# Patient Record
Sex: FEMALE | Race: BLACK OR AFRICAN AMERICAN | Employment: FULL TIME | URBAN - METROPOLITAN AREA
[De-identification: names, ages, dates, MRNs, and addresses within clinical notes are randomized per-mention and may not be internally consistent; named-entity substitution may affect disease eponyms.]

---

## 2020-08-30 ENCOUNTER — HOSPITAL ENCOUNTER (EMERGENCY)
Age: 20
Discharge: HOME | End: 2020-08-30
Attending: EMERGENCY MEDICINE
Payer: MEDICAID

## 2020-08-30 ENCOUNTER — APPOINTMENT (OUTPATIENT)
Dept: ULTRASOUND IMAGING | Age: 20
End: 2020-08-30
Payer: MEDICAID

## 2020-08-30 VITALS
RESPIRATION RATE: 18 BRPM | BODY MASS INDEX: 17.08 KG/M2 | DIASTOLIC BLOOD PRESSURE: 70 MMHG | OXYGEN SATURATION: 100 % | SYSTOLIC BLOOD PRESSURE: 114 MMHG | HEART RATE: 79 BPM | HEIGHT: 69 IN | WEIGHT: 115.3 LBS | TEMPERATURE: 98.4 F

## 2020-08-30 LAB
A/G RATIO: 1.2 (ref 1.1–2.2)
ALBUMIN SERPL-MCNC: 4 G/DL (ref 3.4–5)
ALP BLD-CCNC: 47 U/L (ref 40–129)
ALT SERPL-CCNC: 9 U/L (ref 10–40)
ANION GAP SERPL CALCULATED.3IONS-SCNC: 14 MMOL/L (ref 3–16)
AST SERPL-CCNC: 13 U/L (ref 15–37)
BACTERIA WET PREP: NORMAL
BACTERIA: ABNORMAL /HPF
BASOPHILS ABSOLUTE: 0 K/UL (ref 0–0.2)
BASOPHILS RELATIVE PERCENT: 0.6 %
BILIRUB SERPL-MCNC: 0.5 MG/DL (ref 0–1)
BILIRUBIN URINE: ABNORMAL
BLOOD, URINE: ABNORMAL
BUN BLDV-MCNC: 7 MG/DL (ref 7–20)
CALCIUM SERPL-MCNC: 9.4 MG/DL (ref 8.3–10.6)
CHLORIDE BLD-SCNC: 100 MMOL/L (ref 99–110)
CLARITY: ABNORMAL
CLUE CELLS: NORMAL
CO2: 23 MMOL/L (ref 21–32)
COLOR: YELLOW
CREAT SERPL-MCNC: 0.6 MG/DL (ref 0.6–1.1)
EOSINOPHILS ABSOLUTE: 0 K/UL (ref 0–0.6)
EOSINOPHILS RELATIVE PERCENT: 0.6 %
EPITHELIAL CELLS WET PREP: NORMAL
EPITHELIAL CELLS, UA: ABNORMAL /HPF (ref 0–5)
GFR AFRICAN AMERICAN: >60
GFR NON-AFRICAN AMERICAN: >60
GLOBULIN: 3.4 G/DL
GLUCOSE BLD-MCNC: 81 MG/DL (ref 70–99)
GLUCOSE URINE: NEGATIVE MG/DL
GONADOTROPIN, CHORIONIC (HCG) QUANT: NORMAL MIU/ML
HCT VFR BLD CALC: 38.9 % (ref 36–48)
HEMOGLOBIN: 13.1 G/DL (ref 12–16)
KETONES, URINE: ABNORMAL MG/DL
LEUKOCYTE ESTERASE, URINE: NEGATIVE
LYMPHOCYTES ABSOLUTE: 1.2 K/UL (ref 1–5.1)
LYMPHOCYTES RELATIVE PERCENT: 19.5 %
MCH RBC QN AUTO: 30.3 PG (ref 26–34)
MCHC RBC AUTO-ENTMCNC: 33.8 G/DL (ref 31–36)
MCV RBC AUTO: 89.7 FL (ref 80–100)
MICROSCOPIC EXAMINATION: YES
MONOCYTES ABSOLUTE: 0.5 K/UL (ref 0–1.3)
MONOCYTES RELATIVE PERCENT: 7.6 %
MUCUS: ABNORMAL /LPF
NEUTROPHILS ABSOLUTE: 4.6 K/UL (ref 1.7–7.7)
NEUTROPHILS RELATIVE PERCENT: 71.7 %
NITRITE, URINE: NEGATIVE
PDW BLD-RTO: 13.3 % (ref 12.4–15.4)
PH UA: 6.5 (ref 5–8)
PLATELET # BLD: 262 K/UL (ref 135–450)
PMV BLD AUTO: 7.5 FL (ref 5–10.5)
POTASSIUM REFLEX MAGNESIUM: 3.6 MMOL/L (ref 3.5–5.1)
PROTEIN UA: NEGATIVE MG/DL
RBC # BLD: 4.34 M/UL (ref 4–5.2)
RBC UA: ABNORMAL /HPF (ref 0–4)
RBC WET PREP: NORMAL
SODIUM BLD-SCNC: 137 MMOL/L (ref 136–145)
SOURCE WET PREP: NORMAL
SPECIFIC GRAVITY UA: 1.02 (ref 1–1.03)
TOTAL PROTEIN: 7.4 G/DL (ref 6.4–8.2)
TRICHOMONAS PREP: NORMAL
URINE REFLEX TO CULTURE: ABNORMAL
URINE TYPE: ABNORMAL
UROBILINOGEN, URINE: 0.2 E.U./DL
WBC # BLD: 6.4 K/UL (ref 4–11)
WBC UA: ABNORMAL /HPF (ref 0–5)
WBC WET PREP: NORMAL
YEAST WET PREP: NORMAL

## 2020-08-30 PROCEDURE — 87591 N.GONORRHOEAE DNA AMP PROB: CPT

## 2020-08-30 PROCEDURE — 81001 URINALYSIS AUTO W/SCOPE: CPT

## 2020-08-30 PROCEDURE — 99284 EMERGENCY DEPT VISIT MOD MDM: CPT

## 2020-08-30 PROCEDURE — 84702 CHORIONIC GONADOTROPIN TEST: CPT

## 2020-08-30 PROCEDURE — 36415 COLL VENOUS BLD VENIPUNCTURE: CPT

## 2020-08-30 PROCEDURE — 87210 SMEAR WET MOUNT SALINE/INK: CPT

## 2020-08-30 PROCEDURE — 80053 COMPREHEN METABOLIC PANEL: CPT

## 2020-08-30 PROCEDURE — 85025 COMPLETE CBC W/AUTO DIFF WBC: CPT

## 2020-08-30 PROCEDURE — 76801 OB US < 14 WKS SINGLE FETUS: CPT

## 2020-08-30 PROCEDURE — 87491 CHLMYD TRACH DNA AMP PROBE: CPT

## 2020-08-30 RX ORDER — PRENATAL VIT/IRON FUM/FOLIC AC 27MG-0.8MG
1 TABLET ORAL DAILY
Qty: 30 TABLET | Refills: 0 | Status: SHIPPED | OUTPATIENT
Start: 2020-08-30 | End: 2020-09-29

## 2020-08-30 SDOH — HEALTH STABILITY: MENTAL HEALTH: HOW OFTEN DO YOU HAVE A DRINK CONTAINING ALCOHOL?: NEVER

## 2020-08-30 ASSESSMENT — PAIN DESCRIPTION - DESCRIPTORS: DESCRIPTORS: CRAMPING

## 2020-08-30 ASSESSMENT — PAIN SCALES - GENERAL
PAINLEVEL_OUTOF10: 0
PAINLEVEL_OUTOF10: 0
PAINLEVEL_OUTOF10: 4

## 2020-08-30 ASSESSMENT — PAIN DESCRIPTION - PAIN TYPE: TYPE: ACUTE PAIN

## 2020-08-30 ASSESSMENT — PAIN DESCRIPTION - LOCATION: LOCATION: ABDOMEN

## 2020-08-30 ASSESSMENT — PAIN DESCRIPTION - ORIENTATION: ORIENTATION: LOWER;MID

## 2020-08-30 ASSESSMENT — ENCOUNTER SYMPTOMS: ABDOMINAL PAIN: 1

## 2020-08-30 NOTE — ED NOTES
Explained new orders. Already up to bathroom. abd pain at 4      Explained self swab procedure to pt. Detailed handout given to explain procedure as well. Pt performed self swab procedure for specimens with supervision of RN. Specimens to lab. Tolerated well.          Ember Vitale RN  08/30/20 7834

## 2020-08-30 NOTE — ED PROVIDER NOTES
CHIEF COMPLAINT  Abdominal Pain (to lower abd 5/10 x 2 days, pain worse at night, with nausea. denies discharge. pt states positive pregnancy tests in early August, LMP [facility] July. \" per pt)      HISTORY OF PRESENT ILLNESS  Hanna Grey is a 23 y.o.  female who presents to the ED complaining of lower abdominal pain over the past 2 to 3 days. Patient states that she for started noticing some lower abdominal cramping or 1 week ago and states that over the past 2 to 3 days the pain is gotten worse. States she did have a positive home pregnancy test at the beginning of the month. Denies any associated vaginal bleeding. No vaginal discharge. No nausea or vomiting. No fevers or chills. Patient states he has been sexually active but is not concerned for any STDs. Does not use protection. Denies take any medication for pain prior to coming to the emergency room. States he is been having normal bowel movements. No other complaints, modifying factors or associated symptoms. Nursing notes reviewed. History reviewed. No pertinent past medical history. History reviewed. No pertinent surgical history. History reviewed. No pertinent family history.   Social History     Socioeconomic History    Marital status: Single     Spouse name: Not on file    Number of children: Not on file    Years of education: Not on file    Highest education level: Not on file   Occupational History    Not on file   Social Needs    Financial resource strain: Not on file    Food insecurity     Worry: Not on file     Inability: Not on file    Transportation needs     Medical: Not on file     Non-medical: Not on file   Tobacco Use    Smoking status: Never Smoker    Smokeless tobacco: Never Used   Substance and Sexual Activity    Alcohol use: Never     Frequency: Never    Drug use: Never    Sexual activity: Not on file   Lifestyle    Physical activity     Days per week: Not on file     Minutes per session: Not on file    Stress: Not on file   Relationships    Social connections     Talks on phone: Not on file     Gets together: Not on file     Attends Latter-day service: Not on file     Active member of club or organization: Not on file     Attends meetings of clubs or organizations: Not on file     Relationship status: Not on file    Intimate partner violence     Fear of current or ex partner: Not on file     Emotionally abused: Not on file     Physically abused: Not on file     Forced sexual activity: Not on file   Other Topics Concern    Not on file   Social History Narrative    Not on file     No current facility-administered medications for this encounter. No current outpatient medications on file. No Known Allergies      REVIEW OF SYSTEMS  10 systems reviewed, pertinent positives per HPI otherwise noted to be negative    PHYSICAL EXAM  /72   Pulse 88   Temp 97.8 °F (36.6 °C) (Infrared)   Resp 14   Ht 5' 9\" (1.753 m)   Wt 115 lb 4.8 oz (52.3 kg)   LMP 07/01/2020   SpO2 99%   Breastfeeding No   BMI 17.03 kg/m²      CONSTITUTIONAL: AOx4, cooperative with exam, afebrile   HEAD: normocephalic, atraumatic   EYES: PERRL, EOMI, anicteric sclera   ENT: Moist mucous membranes, uvula midline   LUNGS: Bilateral breath sounds, CTAB, no rales/ronchi/wheezes   CARDIOVASCULAR: RRR, normal S1/S2, no m/r/g, 2+ pulses throughout   ABDOMEN: Soft, suprapubic tenderness, no rebound tenderness or guarding, no upper abdominal tenderness, negative McBurney's point, negative Dent sign, no rigidity, non-distended, +BS   NEUROLOGIC:  MAEx4, GCS 15   MUSCULOSKELETAL: No clubbing, cyanosis or edema   SKIN: No rash, pallor or wounds on exposed surfaces     Patient offered pelvic exam but refused. RADIOLOGY  X-RAYS:  I have reviewed radiologic plain film image(s). ALL OTHER NON-PLAIN FILM IMAGES SUCH AS CT, ULTRASOUND AND MRI HAVE BEEN READ BY THE RADIOLOGIST.   No orders to display          EKG INTERPRETATION  None    PROCEDURES    ED COURSE/MDM  UTI, STD, pyelonephritis, abdominal pain in pregnancy, round ligament pain, ectopic pregnancy    Patient seen and evaluated. History and physical as above. Nontoxic, afebrile. Patient with lower abdominal pain and a home pregnancy test that was positive at the beginning of the month. No complaints of bleeding. Discussed that we do recommend a pelvic exam but patient refused. Patient did agree to self swab. No peritoneal signs on abdominal exam.  Patient offered Tylenol but refused as well. Discussed obtaining quantitative hCG, routine labs, urinalysis, pelvic swabs and patient was agreeable to this work-up. ED Course as of Aug 30 1235   Sun Aug 30, 2020   1234 Patient's quantitative hCG [id number]. Patient updated on results. Recommended that patient go to [facility] for transvaginal ultrasound to rule out ectopic pregnancy. Patient agreeable. Patient would like to go by private vehicle and I feel this is appropriate as awaiting transport would take several hours. Remainder patient's lab work unremarkable. Spoke with Dr. Isaiah Tavares, ED attending at [facility], who did accept transfer. [DS]      ED Course User Index  [DS] Hector Richard MD       Patient was given scripts for the following medications. I counseled patient how to take these medications. New Prescriptions    No medications on file     CRITICAL CARE TIME   Total Critical Care time was 22 minutes, excluding separately reportable procedures. There was a high probability of clinically significant/life threatening deterioration in the patient's condition which required my urgent intervention. CLINICAL IMPRESSION  1. Abdominal pain affecting pregnancy        Blood pressure 110/72, pulse 88, temperature 97.8 °F (36.6 °C), temperature source Infrared, resp.  rate 14, height 5' 9\" (1.753 m), weight 115 lb 4.8 oz (52.3 kg), last menstrual period 07/01/2020, SpO2 99 %, not currently breastfeeding. DISPOSITION  Transfer to [facility] for [address]: All medical record entries made by [address] [place] [address] [address]  dictation.       (Please note that this note was completed with a voice recognition program. Every attempt was made to edit the dictations, but inevitably there remain words that are mis-transcribed.)           Stefani Calhoun MD  08/30/20 1235       Stefani Calhoun MD  08/30/20 [phone]

## 2020-08-30 NOTE — ED NOTES
abd pain at 4. Resting on stretcher. Blankets given. Awaiting test results.      Urban Cantor RN  08/30/20 0797

## 2020-08-30 NOTE — ED NOTES
Bed: B-07  Expected date:   Expected time:   Means of arrival:   Comments:  521 Lenny Szymanski RN  08/30/20 0688

## 2020-08-30 NOTE — ED NOTES
Walked pt from [facility] to ED bed. Obtained VS. Pt wearing mask, medic wearing mask, gloves, safety glasses.      Kierra Stewart, EMT-P  08/30/20 9740

## 2020-08-30 NOTE — ED PROVIDER NOTES
5.20 M/uL    Hemoglobin 13.1 12.0 - 16.0 g/dL    Hematocrit 38.9 36.0 - 48.0 %    MCV 89.7 80.0 - 100.0 fL    MCH 30.3 26.0 - 34.0 pg    MCHC 33.8 31.0 - 36.0 g/dL    RDW 13.3 12.4 - 15.4 %    Platelets 298 508 - 113 K/uL    MPV 7.5 5.0 - 10.5 fL    Neutrophils % 71.7 %    Lymphocytes % 19.5 %    Monocytes % 7.6 %    Eosinophils % 0.6 %    Basophils % 0.6 %    Neutrophils Absolute 4.6 1.7 - 7.7 K/uL    Lymphocytes Absolute 1.2 1.0 - 5.1 K/uL    Monocytes Absolute 0.5 0.0 - 1.3 K/uL    Eosinophils Absolute 0.0 0.0 - 0.6 K/uL    Basophils Absolute 0.0 0.0 - 0.2 K/uL   Comprehensive Metabolic Panel w/ Reflex to MG   Result Value Ref Range    Sodium 137 136 - 145 mmol/L    Potassium reflex Magnesium 3.6 3.5 - 5.1 mmol/L    Chloride 100 99 - 110 mmol/L    CO2 23 21 - 32 mmol/L    Anion Gap 14 3 - 16    Glucose 81 70 - 99 mg/dL    BUN 7 7 - 20 mg/dL    CREATININE 0.6 0.6 - 1.1 mg/dL    GFR Non-African American >60 >60    GFR African American >60 >60    Calcium 9.4 8.3 - 10.6 mg/dL    Total Protein 7.4 6.4 - 8.2 g/dL    Alb 4.0 3.4 - 5.0 g/dL    Albumin/Globulin Ratio 1.2 1.1 - 2.2    Total Bilirubin 0.5 0.0 - 1.0 mg/dL    Alkaline Phosphatase 47 40 - 129 U/L    ALT 9 (L) 10 - 40 U/L    AST 13 (L) 15 - 37 U/L    Globulin 3.4 g/dL   Microscopic Urinalysis   Result Value Ref Range    Mucus, UA 2+ (A) None Seen /LPF    WBC, UA 3-5 0 - 5 /HPF    RBC, UA 3-4 0 - 4 /HPF    Epithelial Cells, UA 11-20 (A) 0 - 5 /HPF    Bacteria, UA 2+ (A) None Seen /HPF     Us Ob Less Than 14 Weeks Single Or First Gestation    Result Date: 8/30/2020  EXAMINATION: FIRST TRIMESTER OBSTETRIC ULTRASOUND 8/30/2020 TECHNIQUE: Transabdominal and transvaginal first trimester obstetric pelvic ultrasound was performed with color Doppler flow evaluation.  COMPARISON: None HISTORY: ORDERING SYSTEM PROVIDED HISTORY: Concern for ectopic TECHNOLOGIST PROVIDED HISTORY: Reason for exam:-> Concern for ectopic Reason for EXAM: Pelvic pain x 2 days with early preg 2. Abdominal pain affecting pregnancy        DISPOSITION Decision To Discharge 08/30/2020 03:39:46 PM     (Please note that portions of this note may have been completed with a voice recognition program. Efforts were made to edit the dictations but occasionally words are mis-transcribed.)    Debby Bay MD  [address] [place] [address] MD [place]  08/30/20 1529

## 2020-08-30 NOTE — ED PROVIDER NOTES
[address] [facility]      Pt Name: Delmy Escalante  MRN: [id number]  [place] 2000  Date of evaluation: 2020  Provider: MD Jo Ann Perkins       Chief Complaint   Patient presents with    Abdominal Pain     to lower abd 5/10 x 2 days, pain worse at night, with nausea. denies discharge. pt states positive pregnancy tests in early August, LMP [facility] July. \" per pt         HISTORY OF PRESENT ILLNESS   (Location/Symptom, Timing/Onset, Context/Setting, Quality, Duration, Modifying Factors, Severity)  Note limiting factors. Delmy Escalante is a 23 y.o. female who presents to the emergency department for ultrasound. HPI     This is a 58-year-old -American female who is a G1,  who presents with abdominal pain for several days. She was worked up at an outlying facility and was transferred here for ultrasound for concern for rule out ectopic. Nursing Notes were reviewed. REVIEW OF SYSTEMS    (2-9 systems for level 4, 10 or more for level 5)     Review of Systems   Gastrointestinal: Positive for abdominal pain. Genitourinary: Positive for vaginal bleeding. Negative for vaginal discharge. Except as noted above the remainder of the review of systems was reviewed and negative. PAST MEDICAL HISTORY   History reviewed. No pertinent past medical history. SURGICAL HISTORY     History reviewed. No pertinent surgical history. CURRENT MEDICATIONS       Previous Medications    No medications on file       ALLERGIES     Patient has no known allergies. FAMILY HISTORY     History reviewed. No pertinent family history.        SOCIAL HISTORY       Social History     Socioeconomic History    Marital status: Single     Spouse name: None    Number of children: None    Years of education: None    Highest education level: None   Occupational History    None   Social Needs    Financial resource strain: None    Food insecurity     Worry: None     Inability: None    Transportation needs     Medical: None     Non-medical: None   Tobacco Use    Smoking status: Never Smoker    Smokeless tobacco: Never Used   Substance and Sexual Activity    Alcohol use: Never     Frequency: Never    Drug use: Never    Sexual activity: None   Lifestyle    Physical activity     Days per week: None     Minutes per session: None    Stress: None   Relationships    Social connections     Talks on phone: None     Gets together: None     Attends Religion service: None     Active member of club or organization: None     Attends meetings of clubs or organizations: None     Relationship status: None    Intimate partner violence     Fear of current or ex partner: None     Emotionally abused: None     Physically abused: None     Forced sexual activity: None   Other Topics Concern    None   Social History Narrative    None       SCREENINGS                        PHYSICAL EXAM    (up to 7 for level 4, 8 or more for level 5)     ED Triage Vitals [08/30/20 1113]   BP Temp Temp Source Pulse Resp SpO2 Height Weight   110/72 97.8 °F (36.6 °C) Infrared 88 14 99 % 5' 9\" (1.753 m) 115 lb 4.8 oz (52.3 kg)       Physical Exam  Constitutional:       Appearance: She is well-developed. HENT:      Head: Normocephalic and atraumatic. Cardiovascular:      Rate and Rhythm: Normal rate and regular rhythm. Heart sounds: No murmur. No friction rub. No gallop. Pulmonary:      Effort: Pulmonary effort is normal.      Breath sounds: Normal breath sounds. Abdominal:      General: Abdomen is flat. Bowel sounds are normal.      Palpations: Abdomen is soft. Tenderness: There is no abdominal tenderness. Skin:     General: Skin is warm and dry. Capillary Refill: Capillary refill takes less than 2 seconds. Neurological:      General: No focal deficit present. Mental Status: She is alert and oriented to person, place, and time. Psychiatric:         Mood and Affect: Mood normal.         Behavior: Behavior normal.         DIAGNOSTIC RESULTS     EKG: All EKG's are interpreted by the Emergency Department Physician who either signs or Co-signs this chart in the absence of a cardiologist.        RADIOLOGY:   Non-plain film images such as CT, Ultrasound and MRI are read by the radiologist. Plain radiographic images are visualized and preliminarily interpreted by the emergency physician with the below findings:        Interpretation per the Radiologist below, if available at the time of this note:    US OB LESS THAN 14 [address]    (Results Pending)         ED BEDSIDE ULTRASOUND:   Performed by ED Physician - none    LABS:  Labs Reviewed   URINE RT REFLEX TO CULTURE - Abnormal; Notable for the following components:       Result Value    Clarity, UA SL CLOUDY (*)     Bilirubin Urine SMALL (*)     Ketones, Urine TRACE (*)     Blood, Urine TRACE-INTACT (*)     All other components within normal limits    Narrative:     Performed at:  [facility]  [address] [place], [facility]   Phone [phone]   COMPREHENSIVE METABOLIC PANEL W/ REFLEX TO MG FOR LOW K - Abnormal; Notable for the following components:    ALT 9 (*)     AST 13 (*)     All other components within normal limits    Narrative:     Performed at:  [facility]  [address] [place], [facility]   Phone [phone]   MICROSCOPIC URINALYSIS - Abnormal; Notable for the following components:    Mucus, UA 2+ (*)     Epithelial Cells, UA 11-20 (*)     Bacteria, UA 2+ (*)     All other components within normal limits    Narrative:     Performed at:  [facility]  [address] [place], [facility]   Phone [phone]   WET PREP, GENITAL    Narrative:     Performed at:  [address] [facility]  [address] [facility]   Phone [phone]   C.TRACHOMATIS N.GONORRHOEAE DNA   HCG, QUANTITATIVE, PREGNANCY    Narrative:     Performed at:  [facility]  [address] [facility]   Phone [phone]   CBC WITH AUTO DIFFERENTIAL    Narrative:     Performed at:  [address] [facility] [address] [facility]  [address] [facility]   Phone [phone]       All other labs were within normal range or not returned as of this dictation. EMERGENCY DEPARTMENT COURSE and DIFFERENTIAL DIAGNOSIS/MDM:   Vitals:    Vitals:    08/30/20 1113 08/30/20 1255   BP: 110/72 114/70   Pulse: 88 79   Resp: 14 18   Temp: 97.8 °F (36.6 °C) 98.4 °F (36.9 °C)   TempSrc: Infrared Oral   SpO2: 99% 100%   Weight: 115 lb 4.8 oz (52.3 kg)    Height: 5' 9\" (1.753 m)        Above history and physical exam were performed. I reviewed her past medical past surgical social family history including the note from the outlying ED. MDM    I considered (as did my previous colleague) the diagnosis of ectopic pregnancy. At this time, her US reading is pending, and [facility]    REASSESSMENT     ED Course as of Aug 30 1422   Sun Aug 30, 2020   1234 Patient's quantitative hCG [id number]. Patient updated on results. Recommended that patient go to [facility] for transvaginal ultrasound to rule out ectopic pregnancy. Patient agreeable. Patient would like to go by private vehicle and I feel this is appropriate as awaiting transport would take several hours. Remainder patient's lab work unremarkable. Spoke with Dr. Kacey Carlos, ED attending at [facility], who did accept transfer. [DS]      ED Course User Index  [DS] Henrry Vences MD         CRITICAL CARE TIME     CONSULTS:  None    PROCEDURES:  Unless otherwise noted below, none     Procedures        FINAL IMPRESSION      1.  Abdominal pain affecting pregnancy          DISPOSITION/PLAN   DISPOSITION Decision To Transfer 08/30/2020 12:32:24 PM      PATIENT REFERRED TO:  No follow-up provider specified. DISCHARGE MEDICATIONS:  New Prescriptions    No medications on file     Controlled Substances Monitoring:     No flowsheet data found.     (Please note that portions of this note were completed with a voice recognition program.  Efforts were made to edit the dictations but occasionally words are mis-transcribed.)    Amira Ramos MD (electronically signed)  Attending Emergency Physician         Amira Ramos MD  09/07/20 5226

## 2020-09-02 LAB
C TRACH DNA GENITAL QL NAA+PROBE: NEGATIVE
N. GONORRHOEAE DNA: NEGATIVE

## 2020-10-08 LAB
ABO, EXTERNAL RESULT: NORMAL
ABO, EXTERNAL RESULT: NORMAL
HEP B, EXTERNAL RESULT: NEGATIVE
HIV, EXTERNAL RESULT: NEGATIVE
HIV, EXTERNAL RESULT: NEGATIVE
RH FACTOR, EXTERNAL RESULT: POSITIVE
RH FACTOR, EXTERNAL RESULT: POSITIVE
RPR, EXTERNAL RESULT: NON REACTIVE
RPR, EXTERNAL RESULT: NON REACTIVE
RUBELLA TITER, EXTERNAL RESULT: NORMAL

## 2020-10-22 LAB
C. TRACHOMATIS, EXTERNAL RESULT: NEGATIVE
N. GONORRHOEAE, EXTERNAL RESULT: NEGATIVE

## 2021-03-19 LAB — GBS, EXTERNAL RESULT: NEGATIVE

## 2021-04-02 ENCOUNTER — OFFICE VISIT (OUTPATIENT)
Dept: PRIMARY CARE CLINIC | Age: 21
End: 2021-04-02
Payer: MEDICAID

## 2021-04-02 DIAGNOSIS — Z01.818 PREOP TESTING: Primary | ICD-10-CM

## 2021-04-02 LAB — SARS-COV-2: NOT DETECTED

## 2021-04-02 PROCEDURE — G8428 CUR MEDS NOT DOCUMENT: HCPCS | Performed by: NURSE PRACTITIONER

## 2021-04-02 PROCEDURE — 99211 OFF/OP EST MAY X REQ PHY/QHP: CPT | Performed by: NURSE PRACTITIONER

## 2021-04-02 PROCEDURE — G8420 CALC BMI NORM PARAMETERS: HCPCS | Performed by: NURSE PRACTITIONER

## 2021-04-03 ENCOUNTER — ANESTHESIA EVENT (OUTPATIENT)
Dept: LABOR AND DELIVERY | Age: 21
DRG: 540 | End: 2021-04-03
Payer: MEDICAID

## 2021-04-03 ENCOUNTER — ANESTHESIA (OUTPATIENT)
Dept: LABOR AND DELIVERY | Age: 21
DRG: 540 | End: 2021-04-03
Payer: MEDICAID

## 2021-04-03 ENCOUNTER — HOSPITAL ENCOUNTER (INPATIENT)
Age: 21
LOS: 2 days | Discharge: HOME | DRG: 540 | End: 2021-04-05
Attending: OBSTETRICS & GYNECOLOGY | Admitting: OBSTETRICS & GYNECOLOGY
Payer: MEDICAID

## 2021-04-03 VITALS
RESPIRATION RATE: 1 BRPM | OXYGEN SATURATION: 100 % | SYSTOLIC BLOOD PRESSURE: 134 MMHG | DIASTOLIC BLOOD PRESSURE: 93 MMHG

## 2021-04-03 DIAGNOSIS — Z98.891 S/P CESAREAN SECTION: Primary | ICD-10-CM

## 2021-04-03 PROBLEM — O28.3 ECHOGENIC INTRACARDIAC FOCUS OF FETUS ON PRENATAL ULTRASOUND: Status: ACTIVE | Noted: 2021-04-03

## 2021-04-03 PROBLEM — R87.612 LGSIL ON PAP SMEAR OF CERVIX: Status: ACTIVE | Noted: 2021-04-03

## 2021-04-03 LAB
ABO/RH: NORMAL
AMPHETAMINE SCREEN, URINE: NORMAL
ANTIBODY SCREEN: NORMAL
BARBITURATE SCREEN URINE: NORMAL
BASOPHILS ABSOLUTE: 0.1 K/UL (ref 0–0.2)
BASOPHILS RELATIVE PERCENT: 2 %
BENZODIAZEPINE SCREEN, URINE: NORMAL
BUPRENORPHINE URINE: NORMAL
CANNABINOID SCREEN URINE: NORMAL
COCAINE METABOLITE SCREEN URINE: NORMAL
EOSINOPHILS ABSOLUTE: 0.1 K/UL (ref 0–0.6)
EOSINOPHILS RELATIVE PERCENT: 1.6 %
HCT VFR BLD CALC: 36.1 % (ref 36–48)
HEMOGLOBIN: 11.8 G/DL (ref 12–16)
LYMPHOCYTES ABSOLUTE: 1.7 K/UL (ref 1–5.1)
LYMPHOCYTES RELATIVE PERCENT: 24.1 %
Lab: NORMAL
MCH RBC QN AUTO: 29 PG (ref 26–34)
MCHC RBC AUTO-ENTMCNC: 32.7 G/DL (ref 31–36)
MCV RBC AUTO: 88.8 FL (ref 80–100)
METHADONE SCREEN, URINE: NORMAL
MONOCYTES ABSOLUTE: 0.8 K/UL (ref 0–1.3)
MONOCYTES RELATIVE PERCENT: 10.5 %
NEUTROPHILS ABSOLUTE: 4.5 K/UL (ref 1.7–7.7)
NEUTROPHILS RELATIVE PERCENT: 61.8 %
OPIATE SCREEN URINE: NORMAL
OXYCODONE URINE: NORMAL
PDW BLD-RTO: 13.4 % (ref 12.4–15.4)
PH UA: 8
PHENCYCLIDINE SCREEN URINE: NORMAL
PLATELET # BLD: 226 K/UL (ref 135–450)
PMV BLD AUTO: 8.2 FL (ref 5–10.5)
PROPOXYPHENE SCREEN: NORMAL
RBC # BLD: 4.07 M/UL (ref 4–5.2)
TOTAL SYPHILLIS IGG/IGM: NORMAL
WBC # BLD: 7.2 K/UL (ref 4–11)

## 2021-04-03 PROCEDURE — 6360000002 HC RX W HCPCS: Performed by: NURSE ANESTHETIST, CERTIFIED REGISTERED

## 2021-04-03 PROCEDURE — 2500000003 HC RX 250 WO HCPCS: Performed by: NURSE ANESTHETIST, CERTIFIED REGISTERED

## 2021-04-03 PROCEDURE — 2580000003 HC RX 258: Performed by: OBSTETRICS & GYNECOLOGY

## 2021-04-03 PROCEDURE — 86780 TREPONEMA PALLIDUM: CPT

## 2021-04-03 PROCEDURE — 80307 DRUG TEST PRSMV CHEM ANLYZR: CPT

## 2021-04-03 PROCEDURE — 86901 BLOOD TYPING SEROLOGIC RH(D): CPT

## 2021-04-03 PROCEDURE — 7100000000 HC PACU RECOVERY - FIRST 15 MIN: Performed by: OBSTETRICS & GYNECOLOGY

## 2021-04-03 PROCEDURE — 6360000002 HC RX W HCPCS

## 2021-04-03 PROCEDURE — 3609079900 HC CESAREAN SECTION: Performed by: OBSTETRICS & GYNECOLOGY

## 2021-04-03 PROCEDURE — 3700000001 HC ADD 15 MINUTES (ANESTHESIA): Performed by: OBSTETRICS & GYNECOLOGY

## 2021-04-03 PROCEDURE — 1220000000 HC SEMI PRIVATE OB R&B

## 2021-04-03 PROCEDURE — 6370000000 HC RX 637 (ALT 250 FOR IP): Performed by: OBSTETRICS & GYNECOLOGY

## 2021-04-03 PROCEDURE — 2709999900 HC NON-CHARGEABLE SUPPLY: Performed by: OBSTETRICS & GYNECOLOGY

## 2021-04-03 PROCEDURE — 85025 COMPLETE CBC W/AUTO DIFF WBC: CPT

## 2021-04-03 PROCEDURE — 86900 BLOOD TYPING SEROLOGIC ABO: CPT

## 2021-04-03 PROCEDURE — 7100000001 HC PACU RECOVERY - ADDTL 15 MIN: Performed by: OBSTETRICS & GYNECOLOGY

## 2021-04-03 PROCEDURE — 6360000002 HC RX W HCPCS: Performed by: OBSTETRICS & GYNECOLOGY

## 2021-04-03 PROCEDURE — 3700000000 HC ANESTHESIA ATTENDED CARE: Performed by: OBSTETRICS & GYNECOLOGY

## 2021-04-03 PROCEDURE — 86850 RBC ANTIBODY SCREEN: CPT

## 2021-04-03 RX ORDER — OXYCODONE HYDROCHLORIDE 5 MG/1
10 TABLET ORAL EVERY 4 HOURS PRN
Status: DISCONTINUED | OUTPATIENT
Start: 2021-04-03 | End: 2021-04-05 | Stop reason: HOSPADM

## 2021-04-03 RX ORDER — PRENATAL WITH FERROUS FUM AND FOLIC ACID 3080; 920; 120; 400; 22; 1.84; 3; 20; 10; 1; 12; 200; 27; 25; 2 [IU]/1; [IU]/1; MG/1; [IU]/1; MG/1; MG/1; MG/1; MG/1; MG/1; MG/1; UG/1; MG/1; MG/1; MG/1; MG/1
1 TABLET ORAL DAILY
Status: DISCONTINUED | OUTPATIENT
Start: 2021-04-03 | End: 2021-04-05 | Stop reason: HOSPADM

## 2021-04-03 RX ORDER — EPHEDRINE SULFATE 50 MG/ML
INJECTION, SOLUTION INTRAVENOUS PRN
Status: DISCONTINUED | OUTPATIENT
Start: 2021-04-03 | End: 2021-04-03 | Stop reason: SDUPTHER

## 2021-04-03 RX ORDER — ACETAMINOPHEN 500 MG
1000 TABLET ORAL EVERY 8 HOURS SCHEDULED
Status: DISCONTINUED | OUTPATIENT
Start: 2021-04-03 | End: 2021-04-05 | Stop reason: HOSPADM

## 2021-04-03 RX ORDER — AZITHROMYCIN 500 MG/1
INJECTION, POWDER, LYOPHILIZED, FOR SOLUTION INTRAVENOUS
Status: COMPLETED
Start: 2021-04-03 | End: 2021-04-03

## 2021-04-03 RX ORDER — MISOPROSTOL 100 UG/1
800 TABLET ORAL PRN
Status: DISCONTINUED | OUTPATIENT
Start: 2021-04-03 | End: 2021-04-05 | Stop reason: HOSPADM

## 2021-04-03 RX ORDER — FERROUS SULFATE 325(65) MG
325 TABLET ORAL 2 TIMES DAILY WITH MEALS
Status: DISCONTINUED | OUTPATIENT
Start: 2021-04-03 | End: 2021-04-05 | Stop reason: HOSPADM

## 2021-04-03 RX ORDER — MORPHINE SULFATE 10 MG/ML
INJECTION, SOLUTION INTRAMUSCULAR; INTRAVENOUS PRN
Status: DISCONTINUED | OUTPATIENT
Start: 2021-04-03 | End: 2021-04-03 | Stop reason: SDUPTHER

## 2021-04-03 RX ORDER — SODIUM CHLORIDE 0.9 % (FLUSH) 0.9 %
10 SYRINGE (ML) INJECTION PRN
Status: DISCONTINUED | OUTPATIENT
Start: 2021-04-03 | End: 2021-04-05 | Stop reason: HOSPADM

## 2021-04-03 RX ORDER — SODIUM CHLORIDE 9 MG/ML
INJECTION, SOLUTION INTRAVENOUS
Status: DISCONTINUED
Start: 2021-04-03 | End: 2021-04-03

## 2021-04-03 RX ORDER — FENTANYL CITRATE 50 UG/ML
INJECTION, SOLUTION INTRAMUSCULAR; INTRAVENOUS PRN
Status: DISCONTINUED | OUTPATIENT
Start: 2021-04-03 | End: 2021-04-03 | Stop reason: SDUPTHER

## 2021-04-03 RX ORDER — IBUPROFEN 800 MG/1
800 TABLET ORAL EVERY 8 HOURS SCHEDULED
Status: DISCONTINUED | OUTPATIENT
Start: 2021-04-03 | End: 2021-04-05 | Stop reason: HOSPADM

## 2021-04-03 RX ORDER — DIPHENHYDRAMINE HYDROCHLORIDE 50 MG/ML
25 INJECTION INTRAMUSCULAR; INTRAVENOUS EVERY 6 HOURS PRN
Status: DISCONTINUED | OUTPATIENT
Start: 2021-04-03 | End: 2021-04-05 | Stop reason: HOSPADM

## 2021-04-03 RX ORDER — LANOLIN 100 %
OINTMENT (GRAM) TOPICAL
Status: DISCONTINUED | OUTPATIENT
Start: 2021-04-03 | End: 2021-04-05 | Stop reason: HOSPADM

## 2021-04-03 RX ORDER — OXYTOCIN 10 [USP'U]/ML
INJECTION, SOLUTION INTRAMUSCULAR; INTRAVENOUS PRN
Status: DISCONTINUED | OUTPATIENT
Start: 2021-04-03 | End: 2021-04-03 | Stop reason: SDUPTHER

## 2021-04-03 RX ORDER — SODIUM CHLORIDE 0.9 % (FLUSH) 0.9 %
10 SYRINGE (ML) INJECTION EVERY 12 HOURS SCHEDULED
Status: DISCONTINUED | OUTPATIENT
Start: 2021-04-03 | End: 2021-04-05 | Stop reason: HOSPADM

## 2021-04-03 RX ORDER — BUPIVACAINE HYDROCHLORIDE 7.5 MG/ML
INJECTION, SOLUTION INTRASPINAL PRN
Status: DISCONTINUED | OUTPATIENT
Start: 2021-04-03 | End: 2021-04-03 | Stop reason: SDUPTHER

## 2021-04-03 RX ORDER — ONDANSETRON 2 MG/ML
4 INJECTION INTRAMUSCULAR; INTRAVENOUS EVERY 6 HOURS PRN
Status: DISCONTINUED | OUTPATIENT
Start: 2021-04-03 | End: 2021-04-05 | Stop reason: HOSPADM

## 2021-04-03 RX ORDER — SODIUM CHLORIDE, SODIUM LACTATE, POTASSIUM CHLORIDE, CALCIUM CHLORIDE 600; 310; 30; 20 MG/100ML; MG/100ML; MG/100ML; MG/100ML
INJECTION, SOLUTION INTRAVENOUS CONTINUOUS
Status: DISCONTINUED | OUTPATIENT
Start: 2021-04-03 | End: 2021-04-03

## 2021-04-03 RX ORDER — SODIUM CHLORIDE, SODIUM LACTATE, POTASSIUM CHLORIDE, CALCIUM CHLORIDE 600; 310; 30; 20 MG/100ML; MG/100ML; MG/100ML; MG/100ML
INJECTION, SOLUTION INTRAVENOUS CONTINUOUS
Status: DISCONTINUED | OUTPATIENT
Start: 2021-04-03 | End: 2021-04-05 | Stop reason: HOSPADM

## 2021-04-03 RX ORDER — DOCUSATE SODIUM 100 MG/1
100 CAPSULE, LIQUID FILLED ORAL 2 TIMES DAILY
Status: DISCONTINUED | OUTPATIENT
Start: 2021-04-03 | End: 2021-04-05 | Stop reason: HOSPADM

## 2021-04-03 RX ORDER — KETOROLAC TROMETHAMINE 30 MG/ML
30 INJECTION, SOLUTION INTRAMUSCULAR; INTRAVENOUS EVERY 8 HOURS
Status: DISCONTINUED | OUTPATIENT
Start: 2021-04-03 | End: 2021-04-05 | Stop reason: HOSPADM

## 2021-04-03 RX ORDER — OXYCODONE HYDROCHLORIDE 5 MG/1
5 TABLET ORAL EVERY 4 HOURS PRN
Status: DISCONTINUED | OUTPATIENT
Start: 2021-04-03 | End: 2021-04-05 | Stop reason: HOSPADM

## 2021-04-03 RX ORDER — MORPHINE SULFATE 1 MG/ML
INJECTION, SOLUTION EPIDURAL; INTRATHECAL; INTRAVENOUS
Status: DISCONTINUED
Start: 2021-04-03 | End: 2021-04-03

## 2021-04-03 RX ADMIN — KETOROLAC TROMETHAMINE 30 MG: 30 INJECTION, SOLUTION INTRAMUSCULAR at 18:21

## 2021-04-03 RX ADMIN — FAMOTIDINE 20 MG: 10 INJECTION, SOLUTION INTRAVENOUS at 03:30

## 2021-04-03 RX ADMIN — MORPHINE SULFATE 0.2 MG: 10 INJECTION, SOLUTION INTRAMUSCULAR; INTRAVENOUS at 03:56

## 2021-04-03 RX ADMIN — ACETAMINOPHEN 1000 MG: 500 TABLET ORAL at 22:28

## 2021-04-03 RX ADMIN — SODIUM CHLORIDE, POTASSIUM CHLORIDE, SODIUM LACTATE AND CALCIUM CHLORIDE: 600; 310; 30; 20 INJECTION, SOLUTION INTRAVENOUS at 04:19

## 2021-04-03 RX ADMIN — OXYTOCIN 20 UNITS: 10 INJECTION INTRAVENOUS at 04:19

## 2021-04-03 RX ADMIN — Medication 10 ML: at 22:39

## 2021-04-03 RX ADMIN — SODIUM CHLORIDE, POTASSIUM CHLORIDE, SODIUM LACTATE AND CALCIUM CHLORIDE: 600; 310; 30; 20 INJECTION, SOLUTION INTRAVENOUS at 04:54

## 2021-04-03 RX ADMIN — EPHEDRINE SULFATE 10 MG: 50 INJECTION INTRAMUSCULAR; INTRAVENOUS; SUBCUTANEOUS at 03:58

## 2021-04-03 RX ADMIN — BUPIVACAINE HYDROCHLORIDE 1.8 ML: 7.5 INJECTION, SOLUTION SUBARACHNOID at 03:56

## 2021-04-03 RX ADMIN — SODIUM CHLORIDE, POTASSIUM CHLORIDE, SODIUM LACTATE AND CALCIUM CHLORIDE: 600; 310; 30; 20 INJECTION, SOLUTION INTRAVENOUS at 02:55

## 2021-04-03 RX ADMIN — OXYTOCIN 10 UNITS: 10 INJECTION INTRAVENOUS at 04:54

## 2021-04-03 RX ADMIN — DOCUSATE SODIUM 100 MG: 100 CAPSULE, LIQUID FILLED ORAL at 22:29

## 2021-04-03 RX ADMIN — Medication 10 ML: at 18:21

## 2021-04-03 RX ADMIN — AZITHROMYCIN MONOHYDRATE 500 MG: 500 INJECTION, POWDER, LYOPHILIZED, FOR SOLUTION INTRAVENOUS at 04:10

## 2021-04-03 RX ADMIN — KETOROLAC TROMETHAMINE 30 MG: 30 INJECTION, SOLUTION INTRAMUSCULAR at 07:46

## 2021-04-03 RX ADMIN — FENTANYL CITRATE 15 MCG: 50 INJECTION INTRAMUSCULAR; INTRAVENOUS at 03:56

## 2021-04-03 RX ADMIN — CEFAZOLIN 2 G: 10 INJECTION, POWDER, FOR SOLUTION INTRAVENOUS at 03:51

## 2021-04-03 RX ADMIN — ONDANSETRON 4 MG: 2 INJECTION INTRAMUSCULAR; INTRAVENOUS at 07:46

## 2021-04-03 ASSESSMENT — PULMONARY FUNCTION TESTS
PIF_VALUE: 0
PIF_VALUE: 1
PIF_VALUE: 0

## 2021-04-03 ASSESSMENT — PAIN SCALES - GENERAL: PAINLEVEL_OUTOF10: 6

## 2021-04-03 NOTE — PLAN OF CARE
Problem: Pain:  Goal: Pain level will decrease  Description: Pain level will decrease  4/3/2021 0810 by Jennifer Brito RN  Outcome: Completed  4/3/2021 0810 by Jennifer Brito RN  Outcome: Ongoing  Goal: Control of acute pain  Description: Control of acute pain  4/3/2021 0810 by Jennifer Brito RN  Outcome: Completed  4/3/2021 0810 by Jennifer Brito RN  Outcome: Ongoing  Goal: Control of chronic pain  Description: Control of chronic pain  4/3/2021 0810 by Jennifer Brito RN  Outcome: Completed  4/3/2021 0810 by Jennifer Brito RN  Outcome: Ongoing     Problem: Discharge Planning:  Goal: Discharged to appropriate level of care  Description: Discharged to appropriate level of care  4/3/2021 0810 by Jennifer Brito RN  Outcome: Completed  4/3/2021 0810 by Jennifer Brito RN  Outcome: Ongoing     Problem: Fluid Volume - Imbalance:  Goal: Absence of postpartum hemorrhage signs and symptoms  Description: Absence of postpartum hemorrhage signs and symptoms  4/3/2021 0810 by Jennifer Brito RN  Outcome: Completed  4/3/2021 0810 by Jennifer Brito RN  Outcome: Ongoing  Goal: Absence of imbalanced fluid volume signs and symptoms  Description: Absence of imbalanced fluid volume signs and symptoms  4/3/2021 0810 by Jennifer Brito RN  Outcome: Completed  4/3/2021 0810 by Jennifer Brito RN  Outcome: Ongoing

## 2021-04-03 NOTE — ANESTHESIA PRE PROCEDURE
Department of Anesthesiology  Preprocedure Note       Name:  Lion Schumacher   Age:  23 y.o.  :  2000                                          MRN:  [id number]         Date:  4/3/2021      Surgeon: Savage Romero):  Dolly Pollack DO    Procedure: Procedure(s):   SECTION    Medications prior to admission:   Prior to Admission medications    Medication Sig Start Date End Date Taking? Authorizing Provider   Prenatal Vit-Fe Fumarate-FA (PRENATAL VITAMIN) 27-0.8 MG TABS Take 1 tablet by mouth daily 20  Dyllan Cooper MD       Current medications:    Current Facility-Administered Medications   Medication Dose Route Frequency Provider Last Rate Last Admin    azithromycin (ZITHROMAX) 500 MG injection             CEFAZOLIN 2000 MG D5W 100 ML IVPB IVPB             sodium chloride 0.9 % infusion             lactated ringers infusion   Intravenous Continuous Sera Marcial,  mL/hr at 21 0255 New Bag at 21 0255    morphine (PF) 1 MG/ML injection             famotidine (PEPCID) 20 MG/2ML injection                Allergies:  No Known Allergies    Problem List:  There is no problem list on file for this patient. Past Medical History:  History reviewed. No pertinent past medical history. Past Surgical History:  History reviewed. No pertinent surgical history.     Social History:    Social History     Tobacco Use    Smoking status: Never Smoker    Smokeless tobacco: Never Used   Substance Use Topics    Alcohol use: Never     Frequency: Never                                Counseling given: Not Answered      Vital Signs (Current):   Vitals:    21 0247 21 0250   BP: (!) 141/86    Pulse: 93    Resp: 16    Weight:  153 lb (69.4 kg)   Height:  5' 9\" (1.753 m)                                              BP Readings from Last 3 Encounters:   21 (!) 141/86   20 114/70   18 122/86       NPO Status: Time of last liquid consumption: 0200 Time of last solid consumption: 2300                        Date of last liquid consumption: 04/03/21                        Date of last solid food consumption: 04/02/21    BMI:   Wt Readings from Last 3 Encounters:   04/03/21 153 lb (69.4 kg)   08/30/20 115 lb 4.8 oz (52.3 kg)   07/07/18 100 lb (45.4 kg)     Body mass index is 22.59 kg/m². CBC:   Lab Results   Component Value Date    WBC 7.2 04/03/2021    RBC 4.07 04/03/2021    HGB 11.8 04/03/2021    HCT 36.1 04/03/2021    MCV 88.8 04/03/2021    RDW 13.4 04/03/2021     04/03/2021       CMP:   Lab Results   Component Value Date     08/30/2020    K 3.6 08/30/2020     08/30/2020    CO2 23 08/30/2020    BUN 7 08/30/2020    CREATININE 0.6 08/30/2020    GFRAA >60 08/30/2020    AGRATIO 1.2 08/30/2020    LABGLOM >60 08/30/2020    GLUCOSE 81 08/30/2020    PROT 7.4 08/30/2020    CALCIUM 9.4 08/30/2020    BILITOT 0.5 08/30/2020    ALKPHOS 47 08/30/2020    AST 13 08/30/2020    ALT 9 08/30/2020       POC Tests: No results for input(s): POCGLU, POCNA, POCK, POCCL, POCBUN, POCHEMO, POCHCT in the last 72 hours.     Coags: No results found for: PROTIME, INR, APTT    HCG (If Applicable):   Lab Results   Component Value Date    PREGTESTUR POSITIVE 07/07/2018        ABGs: No results found for: PHART, PO2ART, CWM7AJZ, PDP6ZJH, BEART, J6MXJPLK     Type & Screen (If Applicable):  No results found for: LABABO, LABRH    Drug/Infectious Status (If Applicable):  No results found for: HIV, HEPCAB    COVID-19 Screening (If Applicable):   Lab Results   Component Value Date    COVID19 Not Detected 04/02/2021           Anesthesia Evaluation  Patient summary reviewed and Nursing notes reviewed no history of anesthetic complications:   Airway: Mallampati: II     Neck ROM: full   Dental: normal exam         Pulmonary:Negative Pulmonary ROS and normal exam                               Cardiovascular:Negative CV ROS                      Neuro/Psych:   Negative Neuro/Psych ROS              GI/Hepatic/Renal: Neg GI/Hepatic/Renal ROS            Endo/Other: Negative Endo/Other ROS                    Abdominal:           Vascular:                                        Anesthesia Plan      spinal     ASA 1 - emergent     (I discussed with the patient the risks and benefits of PIV, neuraxial anesthesia with narcotics, with general anesthesia backup. All questions were answered the patient agrees with the plan. Recent Vitals:  --------------------            04/03/21               0247     --------------------   BP:     (!) 141/86   Pulse:      93       Resp:       16      --------------------  Body mass index is 22.59 kg/m². Social History    Tobacco Use      Smoking status: Never Smoker      Smokeless tobacco: Never Used      LABS:    CBC  Lab Results       Component                Value               Date/Time                  WBC                      7.2                 04/03/2021 02:55 AM        HGB                      11.8 (L)            04/03/2021 02:55 AM        HCT                      36.1                04/03/2021 02:55 AM        PLT                      226                 04/03/2021 02:55 AM   RENAL  Lab Results       Component                Value               Date/Time                  NA                       137                 08/30/2020 11:38 AM        K                        3.6                 08/30/2020 11:38 AM        CL                       100                 08/30/2020 11:38 AM        CO2                      23                  08/30/2020 11:38 AM        BUN                      7                   08/30/2020 11:38 AM        CREATININE               0.6                 08/30/2020 11:38 AM        GLUCOSE                  81                  08/30/2020 11:38 AM   COAGS  No results found for: PROTIME, INR, APTT)        Anesthetic plan and risks discussed with patient.                       JONAS Barbour CRNA   4/3/2021

## 2021-04-03 NOTE — PROGRESS NOTES
Dr. Doris Llamas notified of pt arrival and sbar. Pt is a schedulded primary  on 21 for large abdominal cicrumfrence. SVE /0 and ruptured membranes around 0235 while entering hospital. Md en route to hospital for  section at this time. Charge nurse notified.

## 2021-04-03 NOTE — H&P
Department of Obstetrics and Gynecology  Attending Obstetrics History and Physical        CHIEF COMPLAINT:  Contractions    HISTORY OF PRESENT ILLNESS:      The patient is a 23 y.o.  2parity 0010 at 45. 4weeks. Patient presents with a chief complaint as above and is being admitted for   without tubal ligation. States her contractions began at 01:00 and ~02:30 felt leakage of fluid and presented to hospital in which later . She recently had US for S<D with normal EFW/Large AC 91%, EIF and randy >1wk ahead 36w2d at 35wk and was offered both a SPENSER vs Primary CD given possible risks of birth injury not limited to shoulder dystocia. Pt was scheduled for Primary CD on 21 showed up in labor at 6cm dilated. Reports +FM, +CTX. denies LOF, VB. Pregnancy c/b echogenic foci on anatomy last seen at [place] [address], QUAD neg, declined NIPT or MFM referral.  LSIL pap 10/2020 plan to repeat cotesting at 12 mos. Provider:  [facility]    Blood Type/Rh: B+  Antibody Screen:  Neg  Rubella:  Immune  RPR:  NR  Hepatitis B Surface Antigen: Neg  HIV:  Neg  Gonorrhea:  Neg  Chlamydia:  Neg  1 hour Glucose Tolerance Test:  109  Group B Strep: neg    REVIEW OF SYSTEMS:    12 point ROS neg     PHYSICAL EXAM:    General appearance: NAD  Lungs: CTAB  Heart: S1S2+  Abdomen: soft, gravid, nonTTP  Fetal heart rate:  CAT I FHT  Cervix: 7/100/0, vtx narrow pelvis   Contraction frequency:  regular     General Labs:    WBC/Hgb/Hct/Plts:  7.2/11.8/36.1/226 (255)    ASSESSMENT AND PLAN:    The patient is a 23 y.o.  3 parity 5 at 38.1 weeks    Principal Problem:  In Labor  For elective primary CD  Declines trial of labor  Reassuring fetal status      Plan:   1. Admit to L&D  2. Routine adm labs ordered. 3. Ancef 2g and Zithromax for infection prophylaxis  4.  R/B/A d/w including risks for shoulder dystocia with risks not limited to birth trauma, brachial plexus injury, clavicular/humeral fracture, death etc. however

## 2021-04-03 NOTE — PLAN OF CARE
Problem: Discharge Planning:  Goal: Discharged to appropriate level of care  Description: Discharged to appropriate level of care  4/3/2021 1650 by Lynn James RN  Outcome: Ongoing  4/3/2021 1111 by Mario Penn RN  Outcome: Ongoing  4/3/2021 0811 by Solitario Flowers RN  Outcome: Ongoing     Problem: Fluid Volume - Imbalance:  Goal: Absence of postpartum hemorrhage signs and symptoms  Description: Absence of postpartum hemorrhage signs and symptoms  4/3/2021 1650 by Lynn James RN  Outcome: Ongoing  4/3/2021 1111 by Mario Penn RN  Outcome: Ongoing  4/3/2021 0811 by Solitario Flowers RN  Outcome: Ongoing  Goal: Absence of imbalanced fluid volume signs and symptoms  Description: Absence of imbalanced fluid volume signs and symptoms  4/3/2021 1650 by Lynn James RN  Outcome: Ongoing  4/3/2021 1111 by Mario Penn RN  Outcome: Ongoing  4/3/2021 0811 by Solitario Flowers RN  Outcome: Ongoing     Problem: Infection - Surgical Site:  Goal: Will show no infection signs and symptoms  Description: Will show no infection signs and symptoms  4/3/2021 1650 by Lynn James RN  Outcome: Ongoing  4/3/2021 1111 by Mario Penn RN  Outcome: Ongoing  4/3/2021 0811 by Solitario Flowers RN  Outcome: Ongoing     Problem: Mood - Altered:  Goal: Mood stable  Description: Mood stable  4/3/2021 1650 by Lynn James RN  Outcome: Ongoing  4/3/2021 1111 by Mario Penn RN  Outcome: Ongoing  4/3/2021 0811 by Solitario Flowers RN  Outcome: Ongoing     Problem: Nausea/Vomiting:  Goal: Absence of nausea/vomiting  Description: Absence of nausea/vomiting  4/3/2021 1650 by Lynn James RN  Outcome: Ongoing  4/3/2021 1111 by Mario Penn RN  Outcome: Ongoing  4/3/2021 0811 by Solitario Flowers RN  Outcome: Ongoing     Problem: Pain - Acute:  Goal: Pain level will decrease  Description: Pain level will decrease  4/3/2021 1650 by Lynn James RN  Outcome: Ongoing  4/3/2021 1111 by Clara De Los Santos RN  Outcome: Ongoing  4/3/2021 0811 by Segundo Russell RN  Outcome: Ongoing     Problem: Urinary Retention:  Goal: Urinary elimination within specified parameters  Description: Urinary elimination within specified parameters  4/3/2021 1650 by Shirley Mccain RN  Outcome: Ongoing  4/3/2021 1111 by Clara De Los Santos RN  Outcome: Ongoing  4/3/2021 0811 by Segundo Russell RN  Outcome: Ongoing     Problem: Venous Thromboembolism:  Goal: Will show no signs or symptoms of venous thromboembolism  Description: Will show no signs or symptoms of venous thromboembolism  4/3/2021 1650 by Shirley Mccain RN  Outcome: Ongoing  4/3/2021 1111 by Clara De Los Santos RN  Outcome: Ongoing  4/3/2021 0811 by Segundo Russell RN  Outcome: Ongoing  Goal: Absence of signs or symptoms of impaired coagulation  Description: Absence of signs or symptoms of impaired coagulation  4/3/2021 1650 by Shirley Mccain RN  Outcome: Ongoing  4/3/2021 1111 by Clara De Los Santos RN  Outcome: Ongoing  4/3/2021 0811 by Segundo Russell RN  Outcome: Ongoing

## 2021-04-03 NOTE — PLAN OF CARE
Problem: Discharge Planning:  Goal: Discharged to appropriate level of care  Description: Discharged to appropriate level of care  4/3/2021 1943 by Cecy Richards RN  Outcome: Ongoing  4/3/2021 1650 by Felicitas Mccloud RN  Outcome: Ongoing  4/3/2021 1111 by Ramila Craft RN  Outcome: Ongoing  4/3/2021 0811 by Evette Cr RN  Outcome: Ongoing     Problem: Fluid Volume - Imbalance:  Goal: Absence of postpartum hemorrhage signs and symptoms  Description: Absence of postpartum hemorrhage signs and symptoms  4/3/2021 1943 by Cecy Richards RN  Outcome: Ongoing  4/3/2021 1650 by Felicitas Mccloud RN  Outcome: Ongoing  4/3/2021 1111 by Ramila Craft RN  Outcome: Ongoing  4/3/2021 0811 by Evette Cr RN  Outcome: Ongoing  Goal: Absence of imbalanced fluid volume signs and symptoms  Description: Absence of imbalanced fluid volume signs and symptoms  4/3/2021 1943 by Cecy Richards RN  Outcome: Ongoing  4/3/2021 1650 by Felicitas Mccloud RN  Outcome: Ongoing  4/3/2021 1111 by Ramila Craft RN  Outcome: Ongoing  4/3/2021 0811 by Evette Cr RN  Outcome: Ongoing     Problem: Infection - Surgical Site:  Goal: Will show no infection signs and symptoms  Description: Will show no infection signs and symptoms  4/3/2021 1943 by Cecy Richards RN  Outcome: Ongoing  4/3/2021 1650 by Felicitas Mccloud RN  Outcome: Ongoing  4/3/2021 1111 by Ramila Craft RN  Outcome: Ongoing  4/3/2021 0811 by Evette Cr RN  Outcome: Ongoing     Problem: Mood - Altered:  Goal: Mood stable  Description: Mood stable  4/3/2021 1943 by Cecy Richards RN  Outcome: Ongoing  4/3/2021 1650 by Felicitas Mccloud RN  Outcome: Ongoing  4/3/2021 1111 by Ramila Craft RN  Outcome: Ongoing  4/3/2021 0811 by Evette Cr RN  Outcome: Ongoing     Problem: Pain - Acute:  Goal: Pain level will decrease  Description: Pain level will decrease  4/3/2021 1943 by Cecy Richards RN  Outcome: Ongoing  4/3/2021 2464

## 2021-04-03 NOTE — LACTATION NOTE
This note was copied from a baby's chart. Breastfeeding education initiated. Introduced self to patient as Lactation RN, name and phone number written on white board in room. Assisted mother with latching infant to left breast in cross cradle hold, emphasized the importance of positioning and obtaining a deep latch. Infant observed with JACIEL, SRS and AS. Reviewed with mother what to expect over the next  24-48 hours with infant feedings, infant output, and breast care. Educated mother on how to hand express colostrum. Reviewed infant feeding cues and encouraged mother to allow infant to breast feed on demand, a minimum of 8-12 times a day after the first day of life. Also encouraged mother to avoid giving infant a pacifier or bottle for at least the first two weeks of life. Binder and breast feeding log reviewed, all questions answered. Initial breastfeeding handouts given. Mother instructed to call Lactation nurse for F/U care as needed.

## 2021-04-03 NOTE — ANESTHESIA PROCEDURE NOTES
Spinal Block    Patient location during procedure: OB  Reason for block: primary anesthetic  Staffing  Resident/CRNA: JONAS Ernandez CRNA  Preanesthetic Checklist  Completed: patient identified, IV checked, risks and benefits discussed, surgical consent, monitors and equipment checked, pre-op evaluation, timeout performed, anesthesia consent given, oxygen available and patient being monitored  Spinal Block  Patient position: sitting  Prep: ChloraPrep and site prepped and draped  Patient monitoring: continuous pulse ox and frequent blood pressure checks  Approach: midline  Location: L3/L4  Provider prep: mask and sterile gloves  Local infiltration: lidocaine  Dose: 0.2  Agent: bupivacaine  Adjuvant: duramorph  Dose: 1.8  Dose: 1.8  Needle  Needle type: Pencan   Needle gauge: 24 G  Needle length: 4 in  Assessment  Sensory level: T4  Swirl obtained: Yes  CSF: clear  Attempts: 1  Hemodynamics: stable  Additional Notes  Pt prepped and draped in sterile fashion. Skin wheal with 1% lidocaine. LUCÍA with 3 cc NS, 25g spinal needle inserted per amy. Clear CSF visualized in hub. Needle withdrawn and catheter threaded. Negative test dose 3cc 1.5% Lidocaine with Epinephrine. Sterile dressing applied.

## 2021-04-03 NOTE — PROGRESS NOTES
PT presented to [facility] triage unit with complaints of worsening ctx and leakage of fluid. PT ambulated to T2. Oriented to room and telephone. Pt provided urine sample and was placed on EFM. Pt states she is feeling the baby move. Denies any vaginal bleeding. Rates her pain a 10 out of 10 during a contraction. Denies any additional pain/concerns at this time.

## 2021-04-03 NOTE — PLAN OF CARE
Problem: Pain:  Description: Pain management should include both nonpharmacologic and pharmacologic interventions.   Goal: Pain level will decrease  Description: Pain level will decrease  4/3/2021 0810 by Debbie Salas RN  Outcome: Completed  4/3/2021 0810 by Debbie Salas RN  Outcome: Ongoing  Goal: Control of acute pain  Description: Control of acute pain  4/3/2021 0810 by Debbie Salas RN  Outcome: Completed  4/3/2021 0810 by Debbie Salas RN  Outcome: Ongoing  Goal: Control of chronic pain  Description: Control of chronic pain  4/3/2021 0810 by Debbie Salas RN  Outcome: Completed  4/3/2021 0810 by Debbie Salas RN  Outcome: Ongoing     Problem: Discharge Planning:  Goal: Discharged to appropriate level of care  Description: Discharged to appropriate level of care  4/3/2021 0810 by Debbie Salas RN  Outcome: Completed  4/3/2021 0810 by Debbie Salas RN  Outcome: Ongoing     Problem: Fluid Volume - Imbalance:  Goal: Absence of postpartum hemorrhage signs and symptoms  Description: Absence of postpartum hemorrhage signs and symptoms  4/3/2021 0810 by Debbie Salas RN  Outcome: Completed  4/3/2021 0810 by Debbie Salas RN  Outcome: Ongoing  Goal: Absence of imbalanced fluid volume signs and symptoms  Description: Absence of imbalanced fluid volume signs and symptoms  4/3/2021 0810 by Debbie Salas RN  Outcome: Completed  4/3/2021 0810 by Debbie Salas RN  Outcome: Ongoing     Problem: Infection - Surgical Site:  Goal: Will show no infection signs and symptoms  Description: Will show no infection signs and symptoms  4/3/2021 0810 by Debbie Salas RN  Outcome: Completed  4/3/2021 0810 by Debbie Salas RN  Outcome: Ongoing     Problem: Mood - Altered:  Goal: Mood stable  Description: Mood stable  4/3/2021 0810 by Debbie Salas RN  Outcome: Completed  4/3/2021 0810 by Debbie Salas RN  Outcome: Ongoing     Problem: Nausea/Vomiting:  Goal: Absence of nausea/vomiting  Description: Absence of nausea/vomiting  4/3/2021 0810 by Eriberto Russ RN  Outcome: Completed  4/3/2021 0810 by Eriberto Russ RN  Outcome: Ongoing     Problem: Pain - Acute:  Goal: Pain level will decrease  Description: Pain level will decrease  4/3/2021 0810 by Eriberto Russ RN  Outcome: Completed  4/3/2021 0810 by Eriberto Russ RN  Outcome: Ongoing     Problem: Urinary Retention:  Goal: Urinary elimination within specified parameters  Description: Urinary elimination within specified parameters  4/3/2021 0810 by Eriberto Russ RN  Outcome: Completed  4/3/2021 0810 by Eriberto Russ RN  Outcome: Ongoing     Problem: Venous Thromboembolism:  Goal: Will show no signs or symptoms of venous thromboembolism  Description: Will show no signs or symptoms of venous thromboembolism  4/3/2021 0810 by Eriberto Russ RN  Outcome: Completed  4/3/2021 0810 by Eriberto Russ RN  Outcome: Ongoing  Goal: Absence of signs or symptoms of impaired coagulation  Description: Absence of signs or symptoms of impaired coagulation  4/3/2021 0810 by Eriberto Russ RN  Outcome: Completed  4/3/2021 0810 by Eriberto Russ RN  Outcome: Ongoing     Problem: Discharge Planning:  Goal: Discharged to appropriate level of care  Description: Discharged to appropriate level of care  4/3/2021 1111 by Fox Spencer RN  Outcome: Ongoing  4/3/2021 0811 by Eriberto Russ RN  Outcome: Ongoing     Problem: Fluid Volume - Imbalance:  Goal: Absence of postpartum hemorrhage signs and symptoms  Description: Absence of postpartum hemorrhage signs and symptoms  4/3/2021 1111 by Fox Spencer RN  Outcome: Ongoing  4/3/2021 0811 by Eriberto Russ RN  Outcome: Ongoing  Goal: Absence of imbalanced fluid volume signs and symptoms  Description: Absence of imbalanced fluid volume signs and symptoms  4/3/2021 1111 by Fox Spencer RN  Outcome: Ongoing  4/3/2021 0811 by Eriberto Russ RN  Outcome: Ongoing     Problem: Infection - Surgical Site:  Goal: Will

## 2021-04-04 LAB
HCT VFR BLD CALC: 37.4 % (ref 36–48)
HEMOGLOBIN: 12.2 G/DL (ref 12–16)
MCH RBC QN AUTO: 29 PG (ref 26–34)
MCHC RBC AUTO-ENTMCNC: 32.6 G/DL (ref 31–36)
MCV RBC AUTO: 89.1 FL (ref 80–100)
PDW BLD-RTO: 13.5 % (ref 12.4–15.4)
PLATELET # BLD: 242 K/UL (ref 135–450)
PMV BLD AUTO: 8.3 FL (ref 5–10.5)
RBC # BLD: 4.19 M/UL (ref 4–5.2)
WBC # BLD: 9.7 K/UL (ref 4–11)

## 2021-04-04 PROCEDURE — 1220000000 HC SEMI PRIVATE OB R&B

## 2021-04-04 PROCEDURE — 6370000000 HC RX 637 (ALT 250 FOR IP): Performed by: OBSTETRICS & GYNECOLOGY

## 2021-04-04 PROCEDURE — 36415 COLL VENOUS BLD VENIPUNCTURE: CPT

## 2021-04-04 PROCEDURE — 85027 COMPLETE CBC AUTOMATED: CPT

## 2021-04-04 RX ADMIN — IBUPROFEN 800 MG: 800 TABLET, FILM COATED ORAL at 20:12

## 2021-04-04 RX ADMIN — ACETAMINOPHEN 1000 MG: 500 TABLET ORAL at 17:11

## 2021-04-04 RX ADMIN — ACETAMINOPHEN 1000 MG: 500 TABLET ORAL at 06:33

## 2021-04-04 RX ADMIN — IBUPROFEN 800 MG: 800 TABLET, FILM COATED ORAL at 10:54

## 2021-04-04 RX ADMIN — IBUPROFEN 800 MG: 800 TABLET, FILM COATED ORAL at 02:29

## 2021-04-04 RX ADMIN — DOCUSATE SODIUM 100 MG: 100 CAPSULE, LIQUID FILLED ORAL at 21:02

## 2021-04-04 RX ADMIN — DOCUSATE SODIUM 100 MG: 100 CAPSULE, LIQUID FILLED ORAL at 10:53

## 2021-04-04 ASSESSMENT — PAIN SCALES - GENERAL
PAINLEVEL_OUTOF10: 7
PAINLEVEL_OUTOF10: 6

## 2021-04-04 NOTE — PROGRESS NOTES
RN attempting to remove abdominal dressing this AM per order to leave incision open to air. Unable to remove underlying dry dressing that was beneath island dressing as it appears to be adhered to the incision. Dr. Jens Peña notified and states she will examine this morning.

## 2021-04-04 NOTE — ANESTHESIA POSTPROCEDURE EVALUATION
Department of Anesthesiology  Postprocedure Note    Patient: Shiva Gómez  MRN: [id number]  YOB: 2000  Date of evaluation: 2021  Time:  10:45 AM     Procedure Summary     Date: 21 Room / Location: [facility] L&D OR [address] [place] [address]    Anesthesia Start: 6145 Anesthesia Stop: 0507    Procedure:  SECTION (N/A ) Diagnosis: (Large abdominal circumference)    Surgeons: Peyton Garcia DO Responsible Provider: Tali Costa MD    Anesthesia Type: spinal ASA Status: 1 - Emergent          Anesthesia Type: spinal    Grey Phase I: Grey Score: (P) 9    Grey Phase II: Grey Score: 10    Last vitals: Reviewed and per EMR flowsheets.        Anesthesia Post Evaluation    Patient location during evaluation: bedside  Patient participation: complete - patient participated  Level of consciousness: awake and alert  Airway patency: patent  Nausea & Vomiting: no nausea and no vomiting  Complications: no  Cardiovascular status: hemodynamically stable  Respiratory status: acceptable  Hydration status: stable

## 2021-04-04 NOTE — PLAN OF CARE
Problem: Discharge Planning:  Goal: Discharged to appropriate level of care  Description: Discharged to appropriate level of care  4/4/2021 0749 by Jason Glez RN  Outcome: Ongoing  4/3/2021 1943 by Shelbi Davies RN  Outcome: Ongoing     Problem: Fluid Volume - Imbalance:  Goal: Absence of postpartum hemorrhage signs and symptoms  Description: Absence of postpartum hemorrhage signs and symptoms  4/4/2021 0749 by Jason Glez RN  Outcome: Ongoing  4/3/2021 1943 by Shelbi Davies RN  Outcome: Ongoing  Goal: Absence of imbalanced fluid volume signs and symptoms  Description: Absence of imbalanced fluid volume signs and symptoms  4/4/2021 0749 by Jason Glez RN  Outcome: Ongoing  4/3/2021 1943 by Shelbi Davies RN  Outcome: Ongoing     Problem: Infection - Surgical Site:  Goal: Will show no infection signs and symptoms  Description: Will show no infection signs and symptoms  4/4/2021 0749 by Jason Glez RN  Outcome: Ongoing  4/3/2021 1943 by Shelbi Davies RN  Outcome: Ongoing     Problem: Mood - Altered:  Goal: Mood stable  Description: Mood stable  4/4/2021 0749 by Jason Glez RN  Outcome: Ongoing  4/3/2021 1943 by Shelbi Davies RN  Outcome: Ongoing     Problem: Nausea/Vomiting:  Goal: Absence of nausea/vomiting  Description: Absence of nausea/vomiting  4/4/2021 0749 by Jason Glez RN  Outcome: Ongoing  4/3/2021 1943 by Shelbi Davies RN  Outcome: Ongoing     Problem: Pain - Acute:  Goal: Pain level will decrease  Description: Pain level will decrease  4/4/2021 0749 by Jason Glez RN  Outcome: Ongoing  4/3/2021 1943 by Shelbi Davies RN  Outcome: Ongoing     Problem: Urinary Retention:  Goal: Urinary elimination within specified parameters  Description: Urinary elimination within specified parameters  4/4/2021 0749 by Jason Glez RN  Outcome: Ongoing  4/3/2021 1943 by Shelbi Davies RN  Outcome: Ongoing     Problem: Venous Thromboembolism:  Goal: Will show no signs or symptoms of venous thromboembolism  Description: Will show no signs or symptoms of venous thromboembolism  4/4/2021 0749 by Loral Aschoff, RN  Outcome: Ongoing  4/3/2021 1943 by Clotilde Zafar RN  Outcome: Ongoing  Goal: Absence of signs or symptoms of impaired coagulation  Description: Absence of signs or symptoms of impaired coagulation  4/4/2021 0749 by Loral Aschoff, RN  Outcome: Ongoing  4/3/2021 1943 by Clotilde Zafar RN  Outcome: Ongoing

## 2021-04-05 VITALS
WEIGHT: 153 LBS | HEART RATE: 84 BPM | OXYGEN SATURATION: 100 % | SYSTOLIC BLOOD PRESSURE: 119 MMHG | TEMPERATURE: 99.2 F | RESPIRATION RATE: 16 BRPM | BODY MASS INDEX: 22.66 KG/M2 | HEIGHT: 69 IN | DIASTOLIC BLOOD PRESSURE: 58 MMHG

## 2021-04-05 PROCEDURE — 2580000003 HC RX 258: Performed by: OBSTETRICS & GYNECOLOGY

## 2021-04-05 PROCEDURE — 6370000000 HC RX 637 (ALT 250 FOR IP): Performed by: OBSTETRICS & GYNECOLOGY

## 2021-04-05 RX ORDER — OXYCODONE HYDROCHLORIDE 5 MG/1
5 TABLET ORAL EVERY 6 HOURS PRN
Qty: 28 TABLET | Refills: 0 | Status: SHIPPED | OUTPATIENT
Start: 2021-04-05 | End: 2021-04-12

## 2021-04-05 RX ORDER — IBUPROFEN 800 MG/1
800 TABLET ORAL EVERY 8 HOURS PRN
Qty: 60 TABLET | Refills: 0 | Status: SHIPPED | OUTPATIENT
Start: 2021-04-05

## 2021-04-05 RX ADMIN — Medication 10 ML: at 05:20

## 2021-04-05 RX ADMIN — IBUPROFEN 800 MG: 800 TABLET, FILM COATED ORAL at 04:49

## 2021-04-05 RX ADMIN — DOCUSATE SODIUM 100 MG: 100 CAPSULE, LIQUID FILLED ORAL at 08:49

## 2021-04-05 RX ADMIN — ACETAMINOPHEN 1000 MG: 500 TABLET ORAL at 08:50

## 2021-04-05 RX ADMIN — ACETAMINOPHEN 1000 MG: 500 TABLET ORAL at 01:00

## 2021-04-05 RX ADMIN — METFORMIN HYDROCHLORIDE 1 TABLET: 500 TABLET, EXTENDED RELEASE ORAL at 08:50

## 2021-04-05 RX ADMIN — OXYCODONE 5 MG: 5 TABLET ORAL at 01:00

## 2021-04-05 ASSESSMENT — PAIN SCALES - GENERAL
PAINLEVEL_OUTOF10: 1
PAINLEVEL_OUTOF10: 5

## 2021-04-05 NOTE — DISCHARGE SUMMARY
Obstetric Discharge Summary    Admitting Diagnosis  IUP 38 weeks  OB History        2    Para   1    Term   1            AB   1    Living   1       SAB        TAB   1    Ectopic        Molar        Multiple   0    Live Births   1                Reasons for Admission on 4/3/2021  2:29 AM  S/P  section [Z98.891]      Prenatal Procedures  None    Intrapartum Procedures                  Delivery: See Labor and Delivery Summary       Postpartum Procedures  None    Postpartum/Operative Complications        Data  Information for the patient's :  Onita Felt Girl Dana Davis [[id number]]   female   Birth Weight: 7 lb 5.1 oz (3.32 kg)     Discharge With Mother  Complications: No    Discharge Diagnosis     S/P CS    Discharge Information  Current Discharge Medication List      START taking these medications    Details   oxyCODONE (ROXICODONE) 5 MG immediate release tablet Take 1 tablet by mouth every 6 hours as needed for Pain for up to 7 days. Qty: 28 tablet, Refills: 0    Comments: Reduce doses taken as pain becomes manageable  Associated Diagnoses: S/P  section      ibuprofen (ADVIL;MOTRIN) 800 MG tablet Take 1 tablet by mouth every 8 hours as needed for Pain  Qty: 60 tablet, Refills: 0         CONTINUE these medications which have NOT CHANGED    Details   Prenatal Vit-Fe Fumarate-FA (PRENATAL VITAMIN) 27-0.8 MG TABS Take 1 tablet by mouth daily  Qty: 30 tablet, Refills: 0             No discharge procedures on file. Discharge to: Home  Follow up in 2 weeks at [place] [address].   Condition good    Comments

## 2021-04-05 NOTE — PROGRESS NOTES
Postop C/S     Pt is a 23 y.o.  POD1 s/p C/S recovering well. Pain well controlled. Lochia appropriate. Tolerating diet. Ambulating. Voiding. Nursing.      Vitals:    21 1040 21 1703 21 2102 21 0518   BP:  115/65 120/72 111/60   Pulse:  92 82 75   Resp: 16 16 16 16   Temp: 98.5 °F (36.9 °C)  98.5 °F (36.9 °C) 98.6 °F (37 °C)   TempSrc: Oral  Oral Oral   SpO2:       Weight:       Height:         Abdomen soft, incision well healing  Ext: non tender    Hgb 12.2     A/P: POD2  Vitals wnl  Continue routine postop care  Pelvic and lifting restrictions reviewed  Pt stable for discharge, instructions reviewed    Alayne Heimlich, MD

## 2021-04-05 NOTE — LACTATION NOTE
Lactation Progress Note      Data:   F/U on 1/0 breast feeder who is hoping to be d/c home today. Mob states that baby is feeding well. Output and wt loss are WNL. Action: Discharge teaching done; what to expect in the first few days of life, to feed baby at first sign of hunger cue for total of 8-12 times per day after the first DOL, how to properly position and latch baby, how to know baby is getting enough, engorgement prevention and treatment, avoiding bottles and pacifiers, pumping and community resources. Encouraged to call [facility] for latch assessment prior to d/c. Encouraged to call [de-identified] or Outpatient [facility] clinic for f/u after d/c. Response: Verbalized understanding. States that she is comfortable with breast feeding for d/c.

## 2021-04-05 NOTE — FLOWSHEET NOTE
Discharge Phone Call Log  Patient Name: Shiva Gómez     [place] Care Provider: Peyton Garcia DO Discharge Date: 2021    Disposition of baby:    Phone Number: [phone] (home)     Attempts to Contact:  Date:    Nurse  Date:    Nurse  Date:    Nurse    1. Now that you are at home is your pain being well controlled? Y/N   What pain reducing measures are you using? ____________________________________        Information for the patient's :  Janet Zavaleta [[id number]]   Delivery Method: , Low Transverse     2. Are you currently  having any infant feeding issues? Y/N _____________________________ If yes, please explain: __________________________________________________________________  3. If breastfeeding, were you satisfied with the breastfeeding support services offered? Y/N  4.  Have you had to supplement? Y/N If yes, please explain: _____________________________________________________       Did you supplement while in the hospital, or begin formula supplementation at home?________________________________________  5. Did your OB provider offer you information about the benefits of breastfeeding during your prenatal visits? Y/N  6.  Have you made or have you already had your first appointment with the baby's doctor? Y/N If no, do you know when to schedule it? Y/N   7.  Have you scheduled your follow-up appointment? Y/N  If no, do you know when to schedule it? Y/N  8. Did staff discuss safe sleep during your stay? Y/N  Did you see the wall cling posted in your room explaining how to keep you and your baby safe? Y/N  10. Did your nurses and physicians include you in the plan of care, communicating with you respectfully? Y/N If no, please explain __________________________  11. Is there anyone in particular you would like to mention who provided care for you? ________________________________  12. Did your discharge occur in a timely manner?   Y/N If no, please explain __________________________  13. Do you have any other questions or concerns I can address today?  Y/N  __________________________________________________      Teaching During interview :_____________________________________________  ___________________________RN       Date:______________Time:________________

## 2022-06-30 ENCOUNTER — HOSPITAL ENCOUNTER (EMERGENCY)
Age: 22
Discharge: HOME | End: 2022-06-30

## 2022-11-23 ENCOUNTER — HOSPITAL ENCOUNTER (EMERGENCY)
Age: 22
Discharge: HOME | End: 2022-11-23
Attending: EMERGENCY MEDICINE
Payer: MEDICAID

## 2022-11-23 VITALS
OXYGEN SATURATION: 100 % | SYSTOLIC BLOOD PRESSURE: 136 MMHG | BODY MASS INDEX: 17.27 KG/M2 | HEIGHT: 69 IN | TEMPERATURE: 99.2 F | WEIGHT: 116.62 LBS | HEART RATE: 97 BPM | RESPIRATION RATE: 16 BRPM | DIASTOLIC BLOOD PRESSURE: 78 MMHG

## 2022-11-23 DIAGNOSIS — L50.9 URTICARIA: Primary | ICD-10-CM

## 2022-11-23 PROCEDURE — 6370000000 HC RX 637 (ALT 250 FOR IP): Performed by: EMERGENCY MEDICINE

## 2022-11-23 PROCEDURE — 99283 EMERGENCY DEPT VISIT LOW MDM: CPT

## 2022-11-23 RX ORDER — DIPHENHYDRAMINE HCL 25 MG
50 TABLET ORAL ONCE
Status: COMPLETED | OUTPATIENT
Start: 2022-11-23 | End: 2022-11-23

## 2022-11-23 RX ORDER — FAMOTIDINE 20 MG/1
20 TABLET, FILM COATED ORAL ONCE
Status: COMPLETED | OUTPATIENT
Start: 2022-11-23 | End: 2022-11-23

## 2022-11-23 RX ORDER — CETIRIZINE HYDROCHLORIDE 10 MG/1
10 TABLET ORAL DAILY
Qty: 30 TABLET | Refills: 0 | Status: SHIPPED | OUTPATIENT
Start: 2022-11-23

## 2022-11-23 RX ORDER — FAMOTIDINE 20 MG/1
20 TABLET, FILM COATED ORAL 2 TIMES DAILY
Qty: 60 TABLET | Refills: 0 | Status: SHIPPED | OUTPATIENT
Start: 2022-11-23

## 2022-11-23 RX ADMIN — DIPHENHYDRAMINE HCL 50 MG: 25 TABLET ORAL at 23:05

## 2022-11-23 RX ADMIN — FAMOTIDINE 20 MG: 20 TABLET, FILM COATED ORAL at 23:05

## 2022-11-23 ASSESSMENT — PAIN - FUNCTIONAL ASSESSMENT: PAIN_FUNCTIONAL_ASSESSMENT: NONE - DENIES PAIN

## 2022-11-23 NOTE — LETTER
[facility] [address]  Phone: [phone]               November 23, 2022    Patient: Gama Espinoza   YOB: 2000   Date of Visit: 11/23/2022       To Whom It May Concern:    Gama Espinoza was seen and treated in our emergency department on 11/23/2022. She may return to work on 11/24/22.       Sincerely,     Tarik Younger RN        Signature:__________________________________

## 2022-11-23 NOTE — LETTER
[facility] [address]  Phone: [phone]             November 23, 2022    Patient: Gabe Wong   YOB: 2000   Date of Visit: 11/23/2022       To Whom It May Concern:    Gabe Wong was seen and treated in our emergency department on 11/23/2022Ms Shantell Ogles was accompanied by Roxanne Espinoza. She may return to work on 12/24/22.       Sincerely,       John Crawley RN      Signature:__________________________________

## 2022-11-26 ASSESSMENT — ENCOUNTER SYMPTOMS
ABDOMINAL PAIN: 0
SHORTNESS OF BREATH: 0
NAUSEA: 0
SORE THROAT: 0
TROUBLE SWALLOWING: 0
WHEEZING: 0
VOICE CHANGE: 0

## 2022-11-26 NOTE — ED PROVIDER NOTES
[address] [facility]  EMERGENCY DEPARTMENTENCOUNTER      Pt Name: Henna Beckwith  MRN: [id number]  [place] 2000  Date ofevaluation: 2022  Provider: Shannon Estrada MD    CHIEF COMPLAINT       Chief Complaint   Patient presents with    Rash     Sudden hives today right after wearing her work clothes previously washed with a new detergent         HISTORY OF PRESENT ILLNESS   (Location/Symptom, Timing/Onset,Context/Setting, Quality, Duration, Modifying Factors, Severity)  Note limiting factors. Henna Beckwith is a 23 y.o. female  who  has no past medical history on file. who presents to the emergency department for evaluation of a rash. Patient reports gradual onset of diffuse rash on her upper body and extremities. Reports pruritic nonpainful rash. Denies difficulty breathing wheezing or sore throat. Denies lightheadedness or dizziness. Reports he did recently change detergent. Denies a history of other known allergies. She did not take any medications. HPI    NursingNotes were reviewed. REVIEW OF SYSTEMS    (2-9 systems for level 4, 10 or more for level 5)     Review of Systems   Constitutional:  Negative for fatigue and fever. HENT:  Negative for sore throat, trouble swallowing and voice change. Respiratory:  Negative for shortness of breath and wheezing. Gastrointestinal:  Negative for abdominal pain and nausea. Musculoskeletal:  Negative for myalgias. Skin:  Positive for rash. Except as noted above the remainder of the review of systems was reviewed and negative. PAST MEDICAL HISTORY   History reviewed. No pertinent past medical history.       SURGICALHISTORY       Past Surgical History:   Procedure Laterality Date     SECTION N/A 4/3/2021     SECTION performed by Jane Odonnell DO at [facility] [address]       Discharge Medication List as of 2022 11:36 PM               Patient has no known allergies. FAMILY HISTORY     History reviewed. No pertinent family history. SOCIAL HISTORY       Social History     Socioeconomic History    Marital status: Single     Spouse name: None    Number of children: None    Years of education: None    Highest education level: None   Tobacco Use    Smoking status: Never    Smokeless tobacco: Never   Vaping Use    Vaping Use: Never used   Substance and Sexual Activity    Alcohol use: Never    Drug use: Never    Sexual activity: Not Currently       SCREENINGS    [place] Coma Scale  Eye Opening: Spontaneous  Best Verbal Response: Oriented  Best Motor Response: Obeys commands  [place] Coma Scale Score: 15        PHYSICAL EXAM    (up to 7 for level 4, 8 or more for level 5)     ED Triage Vitals [11/23/22 2248]   BP Temp Temp Source Heart Rate Resp SpO2 Height Weight   136/78 99.2 °F (37.3 °C) Oral 97 16 100 % 5' 9\" (1.753 m) 116 lb 10 oz (52.9 kg)       Physical Exam  Vitals reviewed. Constitutional:       Appearance: She is well-developed. HENT:      Head: Normocephalic and atraumatic. Mouth/Throat:      Mouth: Mucous membranes are moist.   Eyes:      Extraocular Movements: Extraocular movements intact. Conjunctiva/sclera: Conjunctivae normal.      Pupils: Pupils are equal, round, and reactive to light. Neck:      Trachea: No tracheal deviation. Cardiovascular:      Rate and Rhythm: Normal rate and regular rhythm. Heart sounds: Normal heart sounds. Pulmonary:      Effort: Pulmonary effort is normal.      Breath sounds: Normal breath sounds. No wheezing or rhonchi. Abdominal:      General: There is no distension. Palpations: Abdomen is soft. Tenderness: There is no abdominal tenderness. Musculoskeletal:         General: Normal range of motion. Cervical back: Normal range of motion. Skin:     General: Skin is warm and dry. Capillary Refill: Capillary refill takes less than 2 seconds.    Neurological:      Mental Status: She is alert. RESULTS     EKG: All EKG's are interpreted by the Emergency Department Physician who either signs or Co-signsthis chart in the absence of a cardiologist.        RADIOLOGY:   Jayshree Achilles such as CT, Ultrasound and MRI are read by the radiologist. Plain radiographic images are visualized and preliminarily interpreted by the emergency physician with the below findings:        Interpretation per the Radiologist below, if available at the time ofthis note:    No orders to display         ED BEDSIDE ULTRASOUND:   Performed by ED Physician - none    LABS:  Labs Reviewed - No data to display    All other labs were within normal range or not returned as of this dictation. EMERGENCY DEPARTMENT COURSE and DIFFERENTIAL DIAGNOSIS/MDM:   Vitals:    Vitals:    11/23/22 2248   BP: 136/78   Pulse: 97   Resp: 16   Temp: 99.2 °F (37.3 °C)   TempSrc: Oral   SpO2: 100%   Weight: 116 lb 10 oz (52.9 kg)   Height: 5' 9\" (1.753 m)       Patient was given thefollowing medications:  Medications   famotidine (PEPCID) tablet 20 mg (20 mg Oral Given 11/23/22 2305)   diphenhydrAMINE (BENADRYL) tablet 50 mg (50 mg Oral Given 11/23/22 2305)       ED COURSE & MEDICAL DECISION MAKING    Pertinent Labs & Imaging studies reviewed. (See chart for details)   -  Patient seen and evaluated in the emergency department. -  Triage and nursing notes reviewed and incorporated. -  Old chart records reviewed and incorporated. -  Differential diagnosis includes: Differential diagnosis: Vasculitis, bacterial skin infection, viral rash, systemic infectious rash, Anaphylaxis, Urticaria, other    -  Work-up included:  See above  -  ED treatment included: See above  -  Results discussed with patient. Patient presents to the ED for valuation of rash after exposure. On presentation symptoms consistent with urticaria. Patient provided with oral antihistamines. She was instructed to avoid possible allergens and follow-up with PCP. patient feels improved on reevaluation. Symptomatic treatment with expectant management discussed with the patient and they and/or family members present are amenable to treatment plan and outpatient follow-up. Strict return precautions were discussed with the patient and those present. They demonstrated understanding of when to return to the emergency department for new or worsening symptoms. .  The patient is agreeable with plan of care and disposition. Is this patient to be included in the SEP-1 Core Measure due to severe sepsis or septic shock? No   Exclusion criteria - the patient is NOT to be included for SEP-1 Core Measure due to: Infection is not suspected      REASSESSMENT          CRITICAL CARE TIME   Total Critical Care time was 0 minutes, excluding separately reportable procedures. There was a high probability of clinically significant/life threatening deterioration in the patient's condition which required my urgent intervention. CONSULTS:  None    PROCEDURES:  Unless otherwise noted below, none     Procedures    FINAL IMPRESSION      1.  Urticaria          DISPOSITION/PLAN   DISPOSITION Decision To Discharge 11/23/2022 11:20:59 PM      PATIENT REFERREDTO:  [facility]) Pre-Services  [phone]          DISCHARGEMEDICATIONS:  Discharge Medication List as of 11/23/2022 11:36 PM        START taking these medications    Details   cetirizine (ZYRTEC) 10 MG tablet Take 1 tablet by mouth daily, Disp-30 tablet, R-0Normal      famotidine (PEPCID) 20 MG tablet Take 1 tablet by mouth 2 times daily, Disp-60 tablet, R-0Normal                (Please note that portions of this note were completed with a voice recognition program.  Efforts were made to edit the dictations but occasionally words are mis-transcribed.)    Chantel Farrar MD (electronically signed)  Attending Emergency Physician          Chantel Farrar MD  11/26/22 [id number]

## 2023-02-24 ENCOUNTER — HOSPITAL ENCOUNTER (EMERGENCY)
Age: 23
Discharge: HOME | End: 2023-02-24
Attending: EMERGENCY MEDICINE
Payer: MEDICAID

## 2023-02-24 ENCOUNTER — APPOINTMENT (OUTPATIENT)
Dept: CT IMAGING | Age: 23
End: 2023-02-24
Payer: MEDICAID

## 2023-02-24 VITALS
HEIGHT: 69 IN | BODY MASS INDEX: 17.18 KG/M2 | RESPIRATION RATE: 16 BRPM | WEIGHT: 116 LBS | OXYGEN SATURATION: 98 % | TEMPERATURE: 98.2 F | DIASTOLIC BLOOD PRESSURE: 66 MMHG | SYSTOLIC BLOOD PRESSURE: 115 MMHG | HEART RATE: 100 BPM

## 2023-02-24 DIAGNOSIS — R51.9 ACUTE NONINTRACTABLE HEADACHE, UNSPECIFIED HEADACHE TYPE: Primary | ICD-10-CM

## 2023-02-24 LAB
ANION GAP SERPL CALCULATED.3IONS-SCNC: 8 MMOL/L (ref 3–16)
BASOPHILS ABSOLUTE: 0 K/UL (ref 0–0.2)
BASOPHILS RELATIVE PERCENT: 0.9 %
BUN BLDV-MCNC: 10 MG/DL (ref 7–20)
CALCIUM SERPL-MCNC: 9.6 MG/DL (ref 8.3–10.6)
CHLORIDE BLD-SCNC: 104 MMOL/L (ref 99–110)
CO2: 28 MMOL/L (ref 21–32)
CREAT SERPL-MCNC: 0.7 MG/DL (ref 0.6–1.1)
EOSINOPHILS ABSOLUTE: 0.1 K/UL (ref 0–0.6)
EOSINOPHILS RELATIVE PERCENT: 3.7 %
GFR SERPL CREATININE-BSD FRML MDRD: >60 ML/MIN/{1.73_M2}
GLUCOSE BLD-MCNC: 96 MG/DL (ref 70–99)
HCG(URINE) PREGNANCY TEST: NEGATIVE
HCT VFR BLD CALC: 38.3 % (ref 36–48)
HEMOGLOBIN: 12.7 G/DL (ref 12–16)
LYMPHOCYTES ABSOLUTE: 1.6 K/UL (ref 1–5.1)
LYMPHOCYTES RELATIVE PERCENT: 46.9 %
MCH RBC QN AUTO: 29.4 PG (ref 26–34)
MCHC RBC AUTO-ENTMCNC: 33.2 G/DL (ref 31–36)
MCV RBC AUTO: 88.6 FL (ref 80–100)
MONOCYTES ABSOLUTE: 0.3 K/UL (ref 0–1.3)
MONOCYTES RELATIVE PERCENT: 8.7 %
NEUTROPHILS ABSOLUTE: 1.4 K/UL (ref 1.7–7.7)
NEUTROPHILS RELATIVE PERCENT: 39.8 %
PDW BLD-RTO: 13.7 % (ref 12.4–15.4)
PLATELET # BLD: 229 K/UL (ref 135–450)
PMV BLD AUTO: 7.8 FL (ref 5–10.5)
POTASSIUM REFLEX MAGNESIUM: 3.8 MMOL/L (ref 3.5–5.1)
RBC # BLD: 4.32 M/UL (ref 4–5.2)
SARS-COV-2, NAAT: NOT DETECTED
SODIUM BLD-SCNC: 140 MMOL/L (ref 136–145)
WBC # BLD: 3.5 K/UL (ref 4–11)

## 2023-02-24 PROCEDURE — 70450 CT HEAD/BRAIN W/O DYE: CPT

## 2023-02-24 PROCEDURE — 87635 SARS-COV-2 COVID-19 AMP PRB: CPT

## 2023-02-24 PROCEDURE — 99284 EMERGENCY DEPT VISIT MOD MDM: CPT

## 2023-02-24 PROCEDURE — 80048 BASIC METABOLIC PNL TOTAL CA: CPT

## 2023-02-24 PROCEDURE — 85025 COMPLETE CBC W/AUTO DIFF WBC: CPT

## 2023-02-24 PROCEDURE — 96375 TX/PRO/DX INJ NEW DRUG ADDON: CPT

## 2023-02-24 PROCEDURE — 96374 THER/PROPH/DIAG INJ IV PUSH: CPT

## 2023-02-24 PROCEDURE — 84703 CHORIONIC GONADOTROPIN ASSAY: CPT

## 2023-02-24 PROCEDURE — 6360000002 HC RX W HCPCS: Performed by: EMERGENCY MEDICINE

## 2023-02-24 PROCEDURE — 2580000003 HC RX 258: Performed by: EMERGENCY MEDICINE

## 2023-02-24 RX ORDER — SODIUM CHLORIDE, SODIUM LACTATE, POTASSIUM CHLORIDE, AND CALCIUM CHLORIDE .6; .31; .03; .02 G/100ML; G/100ML; G/100ML; G/100ML
1000 INJECTION, SOLUTION INTRAVENOUS ONCE
Status: COMPLETED | OUTPATIENT
Start: 2023-02-24 | End: 2023-02-24

## 2023-02-24 RX ORDER — KETOROLAC TROMETHAMINE 30 MG/ML
15 INJECTION, SOLUTION INTRAMUSCULAR; INTRAVENOUS ONCE
Status: COMPLETED | OUTPATIENT
Start: 2023-02-24 | End: 2023-02-24

## 2023-02-24 RX ORDER — DIPHENHYDRAMINE HYDROCHLORIDE 50 MG/ML
25 INJECTION INTRAMUSCULAR; INTRAVENOUS ONCE
Status: COMPLETED | OUTPATIENT
Start: 2023-02-24 | End: 2023-02-24

## 2023-02-24 RX ORDER — PROCHLORPERAZINE EDISYLATE 5 MG/ML
10 INJECTION INTRAMUSCULAR; INTRAVENOUS ONCE
Status: COMPLETED | OUTPATIENT
Start: 2023-02-24 | End: 2023-02-24

## 2023-02-24 RX ADMIN — DIPHENHYDRAMINE HYDROCHLORIDE 25 MG: 50 INJECTION, SOLUTION INTRAMUSCULAR; INTRAVENOUS at 06:46

## 2023-02-24 RX ADMIN — KETOROLAC TROMETHAMINE 15 MG: 30 INJECTION, SOLUTION INTRAMUSCULAR; INTRAVENOUS at 06:46

## 2023-02-24 RX ADMIN — SODIUM CHLORIDE, POTASSIUM CHLORIDE, SODIUM LACTATE AND CALCIUM CHLORIDE 1000 ML: 600; 310; 30; 20 INJECTION, SOLUTION INTRAVENOUS at 06:45

## 2023-02-24 RX ADMIN — PROCHLORPERAZINE EDISYLATE 10 MG: 5 INJECTION INTRAMUSCULAR; INTRAVENOUS at 06:46

## 2023-02-24 ASSESSMENT — PAIN - FUNCTIONAL ASSESSMENT
PAIN_FUNCTIONAL_ASSESSMENT: NONE - DENIES PAIN
PAIN_FUNCTIONAL_ASSESSMENT: 0-10

## 2023-02-24 ASSESSMENT — PAIN SCALES - GENERAL: PAINLEVEL_OUTOF10: 2

## 2023-02-24 NOTE — Clinical Note
Andria Duran was seen and treated in our emergency department on 2/24/2023. She may return to work on 02/25/2023. If you have any questions or concerns, please don't hesitate to call.       Ramiro Spangler MD

## 2023-02-24 NOTE — ED PROVIDER NOTES
[address] ENCOUNTER          ATTENDING PHYSICIAN NOTE       Date of evaluation: 2/24/2023    ADDENDUM:      Care of this patient was assumed from Dr. Ron Ro.  The patient was seen for Headache (Presents w/ headaches for roughly 3 dayss. Unaided by medication at home. Denies dizziness and blurred vision. )  . The patient's initial evaluation and plan have been discussed with the prior provider who initially evaluated the patient. Nursing Notes, Past Medical Hx, Past Surgical Hx, Social Hx, Allergies, and Family Hx were all reviewed. At the time of turnover, the remaining items are still pending in her work-up and will be followed up by myself: f/u labs, CT head, reassessment. On my assessment, the patient is ***. Low suspicion for needing LP    Diagnostic Results     Labs:  Please see electronic medical record for any tests performed in the ED     Radiology:  Please see electronic medical record for any tests performed in the ED    Procedures     ED Course          The patient was given the following medications:  Orders Placed This Encounter   Medications    ketorolac (TORADOL) injection 15 mg    prochlorperazine (COMPAZINE) injection 10 mg    diphenhydrAMINE (BENADRYL) injection 25 mg    lactated ringers bolus       CONSULTS:  None    MEDICAL DECISION MAKING / ASSESSMENT / Becky  is a 23 y.o. female ***.  ***    Critical Care:  Due to the immediate potential for life-threatening deterioration due to ***, I spent *** minutes providing critical care. This time excludes time spent performing procedures but includes time spent on direct patient care, history retrieval, review of the chart, and discussions with patient, family, and consultant(s). Clinical Impression     No diagnosis found. Disposition     PATIENT REFERRED TO:  No follow-up provider specified.     DISCHARGE MEDICATIONS:  New Prescriptions    No medications on file       DISPOSITION TO:   [facility]  [address]    Schedule an appointment as soon as possible for a visit in 1 week  As needed    DISCHARGE MEDICATIONS:  Discharge Medication List as of 2/24/2023  9:02 AM          DISPOSITION Decision To Discharge 02/24/2023 08:43:55 AM         Jasper Bumpers, MD  02/27/23 1249

## 2023-02-24 NOTE — DISCHARGE INSTRUCTIONS
You were seen in the Emergency Department for headache. Common migraine or headache triggers include:  ? Dehydration   ? Stress or anxiety  ? Hormonal changes (women's menstrual cycle, for example)  ? Skipping meals or not eating enough  ? Changes in the weather  ? Sleeping too much or too little  ? Bright or flashing lights  ? Drinking alcohol  ? Certain drinks or foods, such as red wine, aged cheese, and hot dogs    Some simple lifestyle adjustments can help to reduce the frequency of your headaches including:  ? Drink plenty of fluids (at least 6-8 glasses of water per day)  ? Stop smoking  ? Reduce the amount of alcohol you drink  ? Decrease or stop drinking/eating caffeine (coffee, tea and soda)  ? Decrease or stop drinking/eating sugar  ? Eat and sleep on a regular schedule  ? Exercise several times per week    If you need to take medication to help with your headaches:  ? Aspirin  ? Acetaminophen (eg, Tylenol®)  ? Nonsteroidal antiinflammatory drugs (NSAIDs) such as ibuprofen (eg, Motrin or Advil), indomethacin, or naproxen (eg, Naprosyn or Aleve). Pain relievers should not be used too often because overuse can lead to medication-overuse headaches or chronic daily headaches. Do not use pain relievers more than nine days per month on average, or more than two doses per headache. People with gastritis (inflammation of the stomach), ulcers, kidney disease, and bleeding conditions should not take products containing aspirin or NSAIDs. Return to the Emergency Department if you develop:  A new type of headache you have never experienced before  Have significant worsening of you normal headaches  Have new vision changes like blurry vision, double vision or blindness  Have difficulty speaking, drooping in your face, weakness in your arms or legs  Difficulty eating or drinking due to nausea or vomiting  Fever and neck pain or stiffness  For any other concern you feel requires evaluation.

## 2023-02-24 NOTE — ED PROVIDER NOTES
[address] [facility]          ATTENDING PHYSICIAN NOTE       Date of evaluation: 2/24/2023    Chief Complaint     Headache (Presents w/ headaches for roughly 3 dayss. Unaided by medication at home. Denies dizziness and blurred vision. )      History of Present Illness     Richie Fermin is a 23 y.o. female with no past medical history who presents to the emergency department with headache. States that headache started about 4 days ago. First noticed it when she woke up from a nap. Headache is behind her left eye and left side of her head. It is intermittent in nature, currently 10 out of 10 in severity. 2 days ago she had some nausea but no vomiting. Had some blurred vision as well. States that she does not recall the last time she had a headache, this is not a typical problem for her. She does not smoke or use oral contraceptives. She has no history of trauma to her head. ASSESSMENT / PLAN  (MEDICAL DECISION MAKING)     INITIAL VITALS: BP: 125/78, Temp: 98.2 °F (36.8 °C), Heart Rate: 82, Resp: 16, SpO2: 91 %      Richie Fermin is a 23 y.o. female who presented with new onset headache. On my assessment patient is afebrile, hemodynamically stable, and in no acute distress. Her neurologic exam as outlined below was nonfocal, nonlateralizing. She was alert and oriented x4. Given that this is a new onset problem for her I have obtained labs and Noncon CT head for screening. However, given her normal neurologic exam, intermittent nature of the headache, I have low concern overall for aneurysmal subarachnoid hemorrhage, dural venous sinus thrombosis, intracranial mass, and other emergent pathology. Patient's symptoms were treated with Toradol, Compazine, Benadryl, and IV fluids. At this time I am going off service and will be signing out care to my colleague Dr. Criss [place].   Her responsibilities will be to follow-up on pending studies, clinical reassessment, and disposition. If patient remains neurologically intact and her symptoms are controlled then I believe she should be appropriate for discharge home with return precautions. Medical Decision Making  Amount and/or Complexity of Data Reviewed  Labs: ordered. Radiology: ordered. Risk  Prescription drug management. Clinical Impression     1. Acute nonintractable headache, unspecified headache type        Disposition     PATIENT REFERRED TO:  No follow-up provider specified. DISCHARGE MEDICATIONS:  New Prescriptions    No medications on file       DISPOSITION    Pending at shift change        Diagnostic Results and Other Data       RADIOLOGY:  CT Head W/O Contrast    (Results Pending)       LABS:   Results for orders placed or performed during the hospital encounter of 02/24/23   CBC with Auto Differential   Result Value Ref Range    WBC 3.5 (L) 4.0 - 11.0 K/uL    RBC 4.32 4.00 - 5.20 M/uL    Hemoglobin 12.7 12.0 - 16.0 g/dL    Hematocrit 38.3 36.0 - 48.0 %    MCV 88.6 80.0 - 100.0 fL    MCH 29.4 26.0 - 34.0 pg    MCHC 33.2 31.0 - 36.0 g/dL    RDW 13.7 12.4 - 15.4 %    Platelets 166 960 - 046 K/uL    MPV 7.8 5.0 - 10.5 fL    Neutrophils % 39.8 %    Lymphocytes % 46.9 %    Monocytes % 8.7 %    Eosinophils % 3.7 %    Basophils % 0.9 %    Neutrophils Absolute 1.4 (L) 1.7 - 7.7 K/uL    Lymphocytes Absolute 1.6 1.0 - 5.1 K/uL    Monocytes Absolute 0.3 0.0 - 1.3 K/uL    Eosinophils Absolute 0.1 0.0 - 0.6 K/uL    Basophils Absolute 0.0 0.0 - 0.2 K/uL   BMP w/ Reflex to MG   Result Value Ref Range    Sodium 140 136 - 145 mmol/L    Potassium reflex Magnesium 3.8 3.5 - 5.1 mmol/L    Chloride 104 99 - 110 mmol/L    CO2 28 21 - 32 mmol/L    Anion Gap 8 3 - 16    Glucose 96 70 - 99 mg/dL    BUN 10 7 - 20 mg/dL    Creatinine 0.7 0.6 - 1.1 mg/dL    Est, Glom Filt Rate >60 >60    Calcium 9.6 8.3 - 10.6 mg/dL     EKG   None    ED BEDSIDE ULTRASOUND:  No results found.     MOST RECENT VITALS:  BP: 109/66,Temp: 98.2 °F (36.8 °C), Heart Rate: 81, Resp: 15, SpO2: 100 %     Procedures     None    ED Course     Nursing Notes, Past Medical Hx, Past Surgical Hx, Social Hx,Allergies, and Family Hx were reviewed. The patient was given the following medications:  Orders Placed This Encounter   Medications    ketorolac (TORADOL) injection 15 mg    prochlorperazine (COMPAZINE) injection 10 mg    diphenhydrAMINE (BENADRYL) injection 25 mg    lactated ringers bolus       CONSULTS:  None    Review of Systems     Review of Systems    Pertinent positive and negative findings as documented in the HPI, otherwise other systems were reviewed and were negative. Past Medical, Surgical, Family, and Social History     She has no past medical history on file. She has a past surgical history that includes  section (N/A, 4/3/2021). Her family history is not on file. She reports that she has never smoked. She has never used smokeless tobacco. She reports that she does not drink alcohol and does not use drugs. Medications     Previous Medications    CETIRIZINE (ZYRTEC) 10 MG TABLET    Take 1 tablet by mouth daily    FAMOTIDINE (PEPCID) 20 MG TABLET    Take 1 tablet by mouth 2 times daily       Allergies     She has No Known Allergies. Physical Exam     INITIAL VITALS: BP: 125/78, Temp: 98.2 °F (36.8 °C), Heart Rate: 82, Resp: 16, SpO2: 91 %   Physical Exam    General: Well developed and well nourished. No acute distress. HEENT: NCAT, PERRL, moist mucous membranes  Neck: Trachea midline, neck supple with FROM  Heart: Regular rate and rhythm. No murmurs, gallops, or rubs appreciated. Lungs: Clear to auscultation in all fields bilaterally. Normal effort. Abd: Nondistended, no signs of trauma. No tenderness to palpation, guarding, or rebound. MSK: No obvious deformities. Range of motion grossly intact. Extremities: No cyanosis or edema. Peripheral pulses intact.   Skin: No rashes, abrasions, contusions, or lacerations noted.  Neuro:   - Mental status: alert, oriented to person, place, time, and event. - Language: receptive and expressive language in tact: no aphasia or dysarthria.  - Motor: extension and flexion at elbow, wrist, hip, knee, and ankle 5/5.  - No pronator drift noted. - Cerebellar: finger to nose and heel to shin tests preserved. - Gait grossly normal.  - Sensory: SILT distally in hands and feet. - Cranial Nerves: EOMI, PERRLA, hearing in tact and symmetric, face symmetric. evidenced by smile and forehead wrinkle, tongue midline, shoulder shrug 5/5. Psych: Mood and affect appropriate.  Thought process and content normal.     Leda Petersen MD  02/24/23 [id number]

## (undated) DEVICE — GLOVE SURG SZ 65 THK91MIL LTX FREE SYN POLYISOPRENE

## (undated) DEVICE — Device

## (undated) DEVICE — SUTURE MCRYL SZ 0 L36IN ABSRB VLT L48MM CTX 1/2 CIR Y398H

## (undated) DEVICE — GLOVE SURG SZ 6 THK91MIL LTX FREE SYN POLYISOPRENE ANTI

## (undated) DEVICE — BLADE CLIPPER GEN PURP NS

## (undated) DEVICE — SUTURE VCRL SZ 3-0 L36IN ABSRB UD L36MM CT-1 1/2 CIR J944H

## (undated) DEVICE — CHLORAPREP 26ML ORANGE

## (undated) DEVICE — SPONGE LAP W18XL18IN WHT COT 4 PLY FLD STRUNG RADPQ DISP ST

## (undated) DEVICE — SUTURE MCRYL SZ 3-0 L27IN ABSRB UD L60MM KS STR REV CUT Y523H

## (undated) DEVICE — DRESSING COMP IS W4XL10IN PD W2XL8IN CNTCT LAYR ADH

## (undated) DEVICE — SUTURE VCRL SZ 0 L36IN ABSRB UD L36MM CT-1 1/2 CIR J946H

## (undated) DEVICE — 3M™ STERI-STRIP™ COMPOUND BENZOIN TINCTURE 40 BAGS/CARTON 4 CARTONS/CASE C1544: Brand: 3M™ STERI-STRIP™

## (undated) DEVICE — SOLUTION IV IRRIG POUR BRL 0.9% SODIUM CHL 2F7124